# Patient Record
Sex: MALE | Race: WHITE | NOT HISPANIC OR LATINO | Employment: OTHER | URBAN - METROPOLITAN AREA
[De-identification: names, ages, dates, MRNs, and addresses within clinical notes are randomized per-mention and may not be internally consistent; named-entity substitution may affect disease eponyms.]

---

## 2017-06-09 ENCOUNTER — CLINICAL SUPPORT NO REQUIREMENTS (OUTPATIENT)
Dept: CARDIOLOGY | Facility: CLINIC | Age: 77
End: 2017-06-09

## 2017-06-09 DIAGNOSIS — Z95.0 PACEMAKER: Primary | ICD-10-CM

## 2017-06-09 PROCEDURE — 93294 REM INTERROG EVL PM/LDLS PM: CPT | Performed by: INTERNAL MEDICINE

## 2017-06-09 PROCEDURE — 93296 REM INTERROG EVL PM/IDS: CPT | Performed by: INTERNAL MEDICINE

## 2019-02-19 ENCOUNTER — APPOINTMENT (RX ONLY)
Dept: URBAN - METROPOLITAN AREA CLINIC 56 | Facility: CLINIC | Age: 79
Setting detail: DERMATOLOGY
End: 2019-02-19

## 2019-02-19 DIAGNOSIS — L20.89 OTHER ATOPIC DERMATITIS: ICD-10-CM

## 2019-02-19 DIAGNOSIS — L85.3 XEROSIS CUTIS: ICD-10-CM

## 2019-02-19 PROBLEM — L30.9 DERMATITIS, UNSPECIFIED: Status: ACTIVE | Noted: 2019-02-19

## 2019-02-19 PROBLEM — H91.90 UNSPECIFIED HEARING LOSS, UNSPECIFIED EAR: Status: ACTIVE | Noted: 2019-02-19

## 2019-02-19 PROBLEM — M12.9 ARTHROPATHY, UNSPECIFIED: Status: ACTIVE | Noted: 2019-02-19

## 2019-02-19 PROBLEM — N40.0 BENIGN PROSTATIC HYPERPLASIA WITHOUT LOWER URINARY TRACT SYMPTOMS: Status: ACTIVE | Noted: 2019-02-19

## 2019-02-19 PROBLEM — I10 ESSENTIAL (PRIMARY) HYPERTENSION: Status: ACTIVE | Noted: 2019-02-19

## 2019-02-19 PROBLEM — E78.5 HYPERLIPIDEMIA, UNSPECIFIED: Status: ACTIVE | Noted: 2019-02-19

## 2019-02-19 PROCEDURE — 11104 PUNCH BX SKIN SINGLE LESION: CPT

## 2019-02-19 PROCEDURE — ? COUNSELING

## 2019-02-19 PROCEDURE — 99203 OFFICE O/P NEW LOW 30 MIN: CPT | Mod: 25

## 2019-02-19 PROCEDURE — ? BIOPSY BY PUNCH METHOD

## 2019-02-19 PROCEDURE — ? ADDITIONAL NOTES

## 2019-02-19 PROCEDURE — ? INVENTORY

## 2019-02-19 ASSESSMENT — LOCATION DETAILED DESCRIPTION DERM
LOCATION DETAILED: RIGHT SUPERIOR MEDIAL MIDBACK
LOCATION DETAILED: LEFT ANTERIOR PROXIMAL THIGH
LOCATION DETAILED: LEFT ANKLE
LOCATION DETAILED: RIGHT ANTERIOR PROXIMAL THIGH
LOCATION DETAILED: RIGHT ANKLE
LOCATION DETAILED: RIGHT ANTERIOR DISTAL THIGH
LOCATION DETAILED: LEFT ANTERIOR DISTAL THIGH

## 2019-02-19 ASSESSMENT — LOCATION SIMPLE DESCRIPTION DERM
LOCATION SIMPLE: RIGHT ANKLE
LOCATION SIMPLE: LEFT THIGH
LOCATION SIMPLE: LEFT ANKLE
LOCATION SIMPLE: RIGHT THIGH
LOCATION SIMPLE: RIGHT LOWER BACK

## 2019-02-19 ASSESSMENT — LOCATION ZONE DERM
LOCATION ZONE: TRUNK
LOCATION ZONE: LEG

## 2019-02-19 NOTE — PROCEDURE: BIOPSY BY PUNCH METHOD
Billing Type: Third-Party Bill
Additional Anesthesia Volume In Cc (Will Not Render If 0): 0
Anesthesia Type: 0.5% bupivacaine
Detail Level: Detailed
Suture Removal: 14 days
Wound Care: Petrolatum
Punch Size In Mm: 4
Anesthesia Volume In Cc (Will Not Render If 0): 0.5
Bill For Surgical Tray: no
Post-Care Instructions: I reviewed with the patient in detail post-care instructions. Patient is to keep the biopsy site dry overnight, and then apply vaseline twice daily until healed. Patient may apply hydrogen peroxide soaks to remove any crusting.
Home Suture Removal Text: Patient was provided a home suture removal kit and will remove their sutures at home.  If they have any questions or difficulties they will call the office.
Epidermal Sutures: 4-0 Ethilon
Hemostasis: None
Lab: 6
Was A Bandage Applied: Yes
Consent: Written consent was obtained and risks were reviewed including but not limited to scarring, infection, bleeding, scabbing, incomplete removal, nerve damage and allergy to anesthesia.
Dressing: bandage
Notification Instructions: Patient will be notified of biopsy results. However, patient instructed to call the office if not contacted within 2 weeks.
Biopsy Type: H and E
Lab Facility: [id number]

## 2019-02-19 NOTE — PROCEDURE: ADDITIONAL NOTES
Detail Level: Simple
Additional Notes: Patient has finished prednisone taper and has done Benadryl per pcp.\\nContinue fluocinonide 0.05% ointment BID\\nMoisturize. Recommend Cerave ointment daily \\nPending biopsy report.

## 2019-03-05 ENCOUNTER — APPOINTMENT (RX ONLY)
Dept: URBAN - METROPOLITAN AREA CLINIC 56 | Facility: CLINIC | Age: 79
Setting detail: DERMATOLOGY
End: 2019-03-05

## 2019-03-05 DIAGNOSIS — L20.89 OTHER ATOPIC DERMATITIS: ICD-10-CM | Status: IMPROVED

## 2019-03-05 DIAGNOSIS — Z48.02 ENCOUNTER FOR REMOVAL OF SUTURES: ICD-10-CM

## 2019-03-05 PROBLEM — L20.84 INTRINSIC (ALLERGIC) ECZEMA: Status: ACTIVE | Noted: 2019-03-05

## 2019-03-05 PROCEDURE — ? ADDITIONAL NOTES

## 2019-03-05 PROCEDURE — 99213 OFFICE O/P EST LOW 20 MIN: CPT

## 2019-03-05 PROCEDURE — 99024 POSTOP FOLLOW-UP VISIT: CPT

## 2019-03-05 PROCEDURE — ? COUNSELING

## 2019-03-05 PROCEDURE — ? SUTURE REMOVAL (GLOBAL PERIOD)

## 2019-03-05 ASSESSMENT — LOCATION SIMPLE DESCRIPTION DERM
LOCATION SIMPLE: LEFT THIGH
LOCATION SIMPLE: RIGHT THIGH

## 2019-03-05 ASSESSMENT — LOCATION DETAILED DESCRIPTION DERM
LOCATION DETAILED: LEFT ANTERIOR DISTAL THIGH
LOCATION DETAILED: RIGHT ANTERIOR PROXIMAL THIGH

## 2019-03-05 ASSESSMENT — LOCATION ZONE DERM: LOCATION ZONE: LEG

## 2019-03-05 NOTE — PROCEDURE: SUTURE REMOVAL (GLOBAL PERIOD)
Detail Level: Detailed
Add 42361 Cpt? (Important Note: In 2017 The Use Of 48086 Is Being Tracked By Cms To Determine Future Global Period Reimbursement For Global Periods): yes

## 2019-03-05 NOTE — PROCEDURE: ADDITIONAL NOTES
Additional Notes: Continue cerave ointment daily.\\nMay use fluocinonide ointment as needed for itching.\\nDiscontinue Benadryl.\\nBiopsy results reviewed and discussed
Detail Level: Simple

## 2019-05-01 ENCOUNTER — APPOINTMENT (RX ONLY)
Dept: URBAN - METROPOLITAN AREA CLINIC 56 | Facility: CLINIC | Age: 79
Setting detail: DERMATOLOGY
End: 2019-05-01

## 2019-05-01 DIAGNOSIS — L20.89 OTHER ATOPIC DERMATITIS: ICD-10-CM | Status: IMPROVED

## 2019-05-01 PROBLEM — L20.84 INTRINSIC (ALLERGIC) ECZEMA: Status: ACTIVE | Noted: 2019-05-01

## 2019-05-01 PROCEDURE — ? COUNSELING

## 2019-05-01 PROCEDURE — ? ADDITIONAL NOTES

## 2019-05-01 PROCEDURE — ? INVENTORY

## 2019-05-01 PROCEDURE — 99213 OFFICE O/P EST LOW 20 MIN: CPT

## 2019-05-01 ASSESSMENT — LOCATION DETAILED DESCRIPTION DERM
LOCATION DETAILED: LEFT ANTERIOR DISTAL THIGH
LOCATION DETAILED: RIGHT ANTERIOR DISTAL THIGH

## 2019-05-01 ASSESSMENT — LOCATION SIMPLE DESCRIPTION DERM
LOCATION SIMPLE: RIGHT THIGH
LOCATION SIMPLE: LEFT THIGH

## 2019-05-01 ASSESSMENT — LOCATION ZONE DERM: LOCATION ZONE: LEG

## 2019-05-01 NOTE — PROCEDURE: ADDITIONAL NOTES
Detail Level: Simple
Additional Notes: Continue CeraVe healing ointment daily.\\nUse Fluocinonide 0.05% ointment as needed for flare/itch.

## 2019-05-01 NOTE — PROCEDURE: MIPS QUALITY
Detail Level: Detailed
Quality 431: Preventive Care And Screening: Unhealthy Alcohol Use - Screening: Patient screened for unhealthy alcohol use using a single question and scores less than 2 times per year
Quality 111:Pneumonia Vaccination Status For Older Adults: Pneumococcal Vaccination Previously Received
Quality 226: Preventive Care And Screening: Tobacco Use: Screening And Cessation Intervention: Patient screened for tobacco use and is an ex/non-smoker

## 2020-02-26 ENCOUNTER — IMPORTED ENCOUNTER (OUTPATIENT)
Dept: URBAN - METROPOLITAN AREA CLINIC 50 | Facility: CLINIC | Age: 80
End: 2020-02-26

## 2020-03-03 ENCOUNTER — IMPORTED ENCOUNTER (OUTPATIENT)
Dept: URBAN - METROPOLITAN AREA CLINIC 50 | Facility: CLINIC | Age: 80
End: 2020-03-03

## 2020-03-09 ENCOUNTER — IMPORTED ENCOUNTER (OUTPATIENT)
Dept: URBAN - METROPOLITAN AREA CLINIC 50 | Facility: CLINIC | Age: 80
End: 2020-03-09

## 2020-03-10 ENCOUNTER — IMPORTED ENCOUNTER (OUTPATIENT)
Dept: URBAN - METROPOLITAN AREA CLINIC 50 | Facility: CLINIC | Age: 80
End: 2020-03-10

## 2020-07-30 ENCOUNTER — OFFICE VISIT CONVERTED (OUTPATIENT)
Dept: CARDIOLOGY | Facility: CLINIC | Age: 80
End: 2020-07-30
Attending: INTERNAL MEDICINE

## 2020-08-08 ENCOUNTER — HOSPITAL ENCOUNTER (OUTPATIENT)
Dept: PREADMISSION TESTING | Facility: HOSPITAL | Age: 80
Discharge: HOME | End: 2020-08-08
Attending: OPHTHALMOLOGY

## 2020-08-09 LAB — SARS-COV-2 RNA SPEC QL NAA+PROBE: NOT DETECTED

## 2020-08-15 ENCOUNTER — HOSPITAL ENCOUNTER (OUTPATIENT)
Dept: PREADMISSION TESTING | Facility: HOSPITAL | Age: 80
Discharge: HOME | End: 2020-08-15
Attending: OPHTHALMOLOGY

## 2020-08-16 LAB — SARS-COV-2 RNA SPEC QL NAA+PROBE: NOT DETECTED

## 2020-08-20 ENCOUNTER — HOSPITAL ENCOUNTER (OUTPATIENT)
Dept: PERIOP | Facility: HOSPITAL | Age: 80
Setting detail: HOSPITAL OUTPATIENT SURGERY
Discharge: HOME | End: 2020-08-20
Attending: OPHTHALMOLOGY

## 2020-08-22 ENCOUNTER — HOSPITAL ENCOUNTER (OUTPATIENT)
Dept: PREADMISSION TESTING | Facility: HOSPITAL | Age: 80
Discharge: HOME | End: 2020-08-22
Attending: OPHTHALMOLOGY

## 2020-08-22 LAB — SARS-COV-2 RNA SPEC QL NAA+PROBE: NOT DETECTED

## 2020-08-27 ENCOUNTER — HOSPITAL ENCOUNTER (OUTPATIENT)
Dept: PERIOP | Facility: HOSPITAL | Age: 80
Setting detail: HOSPITAL OUTPATIENT SURGERY
Discharge: HOME | End: 2020-08-27
Attending: OPHTHALMOLOGY

## 2020-11-05 ENCOUNTER — IMPORTED ENCOUNTER (OUTPATIENT)
Dept: URBAN - METROPOLITAN AREA CLINIC 50 | Facility: CLINIC | Age: 80
End: 2020-11-05

## 2021-04-17 ASSESSMENT — TONOMETRY
OD_IOP_MMHG: 14
OS_IOP_MMHG: 14
OD_IOP_MMHG: 14
OS_IOP_MMHG: 15

## 2021-04-17 ASSESSMENT — VISUAL ACUITY
OS_CC: 20/30-2
OS_BAT: 20/200
OD_CC: J2@ 14 IN
OD_CC: 20/30-2
OS_OTHER: 20/200. >20/400.
OS_CC: J2@ 14 IN
OS_CC: 20/25-1
OD_BAT: 20/70
OD_CC: 20/25-1
OD_OTHER: 20/70. >20/400.

## 2021-05-10 NOTE — H&P
History and Physical      Patient Name: Renae Pnadya   Patient ID: [id number]   Sex: Male   YOB: 1940    Primary Care Provider: Abelardo Roger MD   Referring Provider: Abelardo Roger MD    Visit Date: July 30, 2020    Provider: Cole Clark MD   Location: [facility]   Location Address: [address] [place] [address], [facility]   [place], [place]  [id number]   Location Phone: [phone]          Chief Complaint     Bradycardia.  Afib.       History Of Present Illness  Consult requested by: Abelardo Roger MD   Renae Pandya is a 80 year old male with a history of bradycardia with dual-chamber pacemaker, paroxysmal atrial fibrillation, and hypertension who denies any problems with chest discomfort, shortness of breath, PND, orthopnea, or lower extremity edema.   PAST MEDICAL HISTORY: Bradycardia with dual-chamber pacemaker; Dementia; Dyslipidemia; Paroxysmal atrial fibrillation; Hypertension.   FAMILY MEDICAL HISTORY: Nonsignificant for premature coronary atherosclerotic disease.   PSYCHOSOCIAL HISTORY: Previously smoked, but quit. Moderate amount of alcohol. Denies caffeine use. .   CURRENT MEDICATIONS: Eliquis 5 mg b.i.d.; amlodipine 10 mg daily; memantine 10 mg b.i.d.; famotidine 20 mg daily; hydroxyzine pamoate 25 mg q. 6 h. p.r.n.; cholecalciferol 1000 units daily; calcium 600 mg daily; losartan 100 mg 1/2 daily; tamsulosin 0.4 mg daily; donepezil 10 mg 1/2 q. h.s.; latanoprost eye drops; clonidine 1 mg p.r.n.; atorvastatin 40 mg 1/2 daily.   ALLERGIES: Codeine; penicillin; sulfa drugs.       Review of Systems  · Constitutional  o Admits  o : good general health lately  o Denies  o : fatigue, recent weight changes   · Eyes  o Denies  o : double vision  · HENT  o Admits  o : hearing loss or ringing, chronic sinus problem  o Denies  o : swollen glands in neck  · Cardiovascular  o Denies  o : chest pain, palpitations (fast, fluttering, or skipping beats), swelling (feet, ankles, hands),  "shortness of breath while walking or lying flat  · Respiratory  o Denies  o : asthma or wheezing, COPD  · Gastrointestinal  o Admits  o : ulcers  o Denies  o : nausea or vomiting  · Neurologic  o Denies  o : lightheaded or dizzy, stroke, headaches  · Musculoskeletal  o Admits  o : joint pain, back pain  · Endocrine  o Denies  o : thyroid disease, diabetes, heat or cold intolerance, excessive thirst or urination  · Heme-Lymph  o Denies  o : bleeding or bruising tendency, anemia      Vitals  Date Time BP Position Site L\R Cuff Size HR RR TEMP (F) WT  HT  BMI kg/m2 BSA m2 O2 Sat HC       07/30/2020 12:58 /80 Sitting    76 - R   188lbs 0oz 5'  7\" 29.44 2.01     07/30/2020 12:58 /70 Sitting    64 - R                 Physical Examination  · Constitutional  o Appearance  o : Awake, alert, in no acute distress.   · Head and Face  o HEENT  o : PERRLA.  · Eyes  o Conjunctivae  o : Normal.  · Ears, Nose, Mouth and Throat  o Oral Cavity  o :   § Oral Mucosa  § : Normal.  · Neck  o Inspection/Palpation  o : No JVD.  · Respiratory  o Respiratory  o : Chest is symmetrical. Lung fields are clear. No rhonchi or wheezes heard.  · Cardiovascular  o Heart  o :   § Auscultation of Heart  § : S1, S2 normal. Regular rate and rhythm without murmurs, gallops, or rubs.  o Peripheral Vascular System  o :   § Extremities  § : +1 lower extremity edema.  · Gastrointestinal  o Abdominal Examination  o : Abdomen soft. No masses. No guarding or rigidity. No hepatosplenomegaly. Bowel sounds normal.  · Musculoskeletal  o General  o :   § General Musculoskeletal  § : Muscle tone and strength were normal.  · Skin and Subcutaneous Tissue  o General Inspection  o : Unremarkable.  · EKG  o EKG  o : Performed in the office today.  o Indications  o : Bradycardia.  o Results  o : Normal sinus versus ectopic atrial rhythm with abnormal T-waves in the anterolateral leads.   o Comparison  o : No previous EKG to compare to.   · Device " Interrogation  o Device Interrogation  o : Dual-chamber pacemaker interrogated today. Right atrial lead paced 82% of the time, otherwise working normally. Right ventricular lead paced 36% of the time, working normally. No events recorded. No changes made to the device. His previous interrogation of his device did show brief atrial fibrillation, a total of 5 episodes, most of which were less than a minute in duration.           Assessment     ASSESSMENT & PLAN:    1.  Bradycardia with dual-chamber pacemaker working properly.  Repeat interrogation in 6 months.  2.  Paroxysmal atrial fibrillation, brief, no recurrent episodes since last interrogation.  Patient is on Eliquis for        CVA prevention.    3.  Hypertension, mildly elevated.  Will increase his losartan to 100 mg daily.  Repeat renal panel in a few        weeks.        Cole Clark MD  JH:                  Electronically Signed by: Jackie De León-, Other -Author on August 12, 2020 08:15:00 AM  Electronically Co-signed by: Cole Clark MD -Reviewer on August 13, 2020 09:28:17 AM

## 2021-05-15 VITALS
HEART RATE: 76 BPM | SYSTOLIC BLOOD PRESSURE: 174 MMHG | DIASTOLIC BLOOD PRESSURE: 80 MMHG | WEIGHT: 188 LBS | HEIGHT: 67 IN | BODY MASS INDEX: 29.51 KG/M2

## 2021-08-04 PROBLEM — I48.0 PAF (PAROXYSMAL ATRIAL FIBRILLATION) (HCC): Status: ACTIVE | Noted: 2021-08-04

## 2021-08-04 NOTE — PROGRESS NOTES
Chief Complaint  Hypertension    Subjective    Patient is doing well does report some memory problems at times but no other active complaints  Past Medical History:   Diagnosis Date   • Essential hypertension 6/23/2016   • PAF (paroxysmal atrial fibrillation) (CMS/HCC) 8/4/2021   • Sick sinus syndrome w/ dual PPM 10/9/2015         Current Outpatient Medications:   •  amLODIPine (NORVASC) 10 MG tablet, Take  by mouth., Disp: , Rfl:   •  Calcium Carb-Cholecalciferol (CALCIUM 600 + D) 600-200 MG-UNIT tablet, Take  by mouth daily., Disp: , Rfl:   •  cloNIDine (CATAPRES) 0.1 MG tablet, Take 0.1 mg by mouth 2 (two) times a day., Disp: , Rfl:   •  donepezil (ARICEPT) 10 MG tablet, Take  by mouth daily., Disp: , Rfl:   •  esomeprazole (NEXIUM) 20 MG capsule, Take  by mouth daily., Disp: , Rfl:   •  latanoprost (XALATAN) 0.005 % ophthalmic solution, Apply  to eye., Disp: , Rfl:   •  losartan (COZAAR) 100 MG tablet, Take 100 mg by mouth Daily., Disp: , Rfl:   •  memantine (NAMENDA XR) 28 MG capsule sustained-release 24 hr extended release capsule, Take  by mouth., Disp: , Rfl:   •  Multiple Vitamin (MULTI VITAMIN DAILY) tablet, Take  by mouth daily., Disp: , Rfl:   •  simvastatin (ZOCOR) 20 MG tablet, Take  by mouth daily., Disp: , Rfl:   •  vitamin D (ERGOCALCIFEROL) 90228 UNITS capsule capsule, Take 50,000 Units by mouth 1 (one) time per week., Disp: , Rfl:   •  aspirin (aspirin) 81 MG EC tablet, Take 1 tablet by mouth Daily., Disp: 90 tablet, Rfl: 3  •  finasteride (PROSCAR) 5 MG tablet, Take  by mouth daily., Disp: , Rfl:   •  LORazepam (ATIVAN) 1 MG tablet, Take  by mouth., Disp: , Rfl:     Medications Discontinued During This Encounter   Medication Reason   • bisoprolol-hydrochlorothiazide (ZIAC) 5-6.25 MG per tablet    • aspirin 81 MG tablet    • Eliquis 5 MG tablet tablet      Allergies   Allergen Reactions   • Codeine Sulfate    • Penicillins    • Sulfa Antibiotics         Social History     Tobacco Use   • Smoking  "status: Former Smoker   • Smokeless tobacco: Never Used   Vaping Use   • Vaping Use: Never used   Substance Use Topics   • Alcohol use: No   • Drug use: No       History reviewed. No pertinent family history.     Objective     /59 (BP Location: Left arm)   Pulse 55   Ht 170.2 cm (67\")   Wt 86.9 kg (191 lb 9.6 oz)   BMI 30.01 kg/m²       Physical Exam    General Appearance:   · no acute distress  · Alert and oriented x3  HENT:   · lips not cyanotic  · Atraumatic  Neck:  · No jvd   · supple  Respiratory:  · no respiratory distress  · normal breath sounds  · no rales  Cardiovascular:  · Regular rate and rhythm  · no S3, no S4   · no murmur  · no rub  Extremities  · No cyanosis  · lower extremity edema: none    Skin:   · warm, dry  · No rashes      Result Review :     No results found for: PROBNP  Dual-chamber pacemaker working properly reviewed interrogation he has not had any paroxysmal atrial fibrillation episodes in over 2 years     No results found for: TSH   No results found for: FREET4   No results found for: DDIMERQUANT  No results found for: MG   No results found for: DIGOXIN   No results found for: TROPONINT          No results found for: POCTROP    Results for orders placed during the hospital encounter of 06/23/16    SCANNED - ECHOCARDIOGRAM                 Diagnoses and all orders for this visit:    1. PAF (paroxysmal atrial fibrillation) (CMS/HCC) (Primary)  Assessment & Plan:  Patient with no recurrent episodes on interrogation in the last couple years he wishes to discontinue the Eliquis for this not unreasonable and recommended discontinuing aspirin 81 daily we will continue with monitoring of his pacemaker      2. Bradycardia, sinus    3. Essential hypertension    4. Sick sinus syndrome (CMS/HCC)  Assessment & Plan:  Is working properly repeat interrogation 6 months no changes made      Other orders  -     aspirin (aspirin) 81 MG EC tablet; Take 1 tablet by mouth Daily.  Dispense: 90 tablet; " Refill: 3          Follow Up     Return in about 18 weeks (around 12/14/2021) for Follow with Sharon Gueavra.          Patient was given instructions and counseling regarding his condition or for health maintenance advice. Please see specific information pulled into the AVS if appropriate.

## 2021-08-10 ENCOUNTER — CLINICAL SUPPORT NO REQUIREMENTS (OUTPATIENT)
Dept: CARDIOLOGY | Facility: CLINIC | Age: 81
End: 2021-08-10

## 2021-08-10 ENCOUNTER — OFFICE VISIT (OUTPATIENT)
Dept: CARDIOLOGY | Facility: CLINIC | Age: 81
End: 2021-08-10

## 2021-08-10 VITALS
BODY MASS INDEX: 30.07 KG/M2 | HEIGHT: 67 IN | SYSTOLIC BLOOD PRESSURE: 146 MMHG | HEART RATE: 55 BPM | WEIGHT: 191.6 LBS | DIASTOLIC BLOOD PRESSURE: 59 MMHG

## 2021-08-10 DIAGNOSIS — I10 ESSENTIAL HYPERTENSION: ICD-10-CM

## 2021-08-10 DIAGNOSIS — I49.5 SSS (SICK SINUS SYNDROME) (HCC): Primary | ICD-10-CM

## 2021-08-10 DIAGNOSIS — I49.5 SICK SINUS SYNDROME (HCC): ICD-10-CM

## 2021-08-10 DIAGNOSIS — I48.0 PAF (PAROXYSMAL ATRIAL FIBRILLATION) (HCC): Primary | ICD-10-CM

## 2021-08-10 DIAGNOSIS — R00.1 BRADYCARDIA, SINUS: ICD-10-CM

## 2021-08-10 PROCEDURE — 99214 OFFICE O/P EST MOD 30 MIN: CPT | Performed by: INTERNAL MEDICINE

## 2021-08-10 PROCEDURE — 93280 PM DEVICE PROGR EVAL DUAL: CPT | Performed by: INTERNAL MEDICINE

## 2021-08-10 RX ORDER — LOSARTAN POTASSIUM 100 MG/1
100 TABLET ORAL DAILY
COMMUNITY
End: 2021-11-08

## 2021-08-10 RX ORDER — APIXABAN 5 MG/1
TABLET, FILM COATED ORAL
COMMUNITY
Start: 2021-05-17 | End: 2021-08-10

## 2021-08-10 RX ORDER — ASPIRIN 81 MG/1
81 TABLET ORAL DAILY
Qty: 90 TABLET | Refills: 3 | Status: SHIPPED | OUTPATIENT
Start: 2021-08-10

## 2021-08-10 NOTE — PROGRESS NOTES
Normal Dual Chamber Pacemaker device interrogation.  Normal evaluation of device function and lead measurements.  No optimization was needed of parameters or maximization of device longevity. Remaining battery is 3 years.  He travels to [place] and also gets his device followed up there, he is deciding if he will return here full time.

## 2021-08-10 NOTE — ASSESSMENT & PLAN NOTE
Patient with no recurrent episodes on interrogation in the last couple years he wishes to discontinue the Eliquis for this not unreasonable and recommended discontinuing aspirin 81 daily we will continue with monitoring of his pacemaker

## 2021-11-08 ENCOUNTER — OFFICE VISIT (OUTPATIENT)
Dept: CARDIOLOGY | Facility: CLINIC | Age: 81
End: 2021-11-08

## 2021-11-08 VITALS
HEIGHT: 67 IN | SYSTOLIC BLOOD PRESSURE: 163 MMHG | WEIGHT: 187 LBS | DIASTOLIC BLOOD PRESSURE: 65 MMHG | HEART RATE: 55 BPM | BODY MASS INDEX: 29.35 KG/M2

## 2021-11-08 DIAGNOSIS — I10 ESSENTIAL HYPERTENSION: ICD-10-CM

## 2021-11-08 DIAGNOSIS — I49.5 SICK SINUS SYNDROME (HCC): ICD-10-CM

## 2021-11-08 DIAGNOSIS — I48.0 PAF (PAROXYSMAL ATRIAL FIBRILLATION) (HCC): Primary | ICD-10-CM

## 2021-11-08 PROCEDURE — 99214 OFFICE O/P EST MOD 30 MIN: CPT | Performed by: NURSE PRACTITIONER

## 2021-11-08 RX ORDER — LOSARTAN POTASSIUM AND HYDROCHLOROTHIAZIDE 25; 100 MG/1; MG/1
1 TABLET ORAL DAILY
Qty: 90 TABLET | Refills: 1 | Status: SHIPPED | OUTPATIENT
Start: 2021-11-08 | End: 2022-11-21 | Stop reason: SDUPTHER

## 2021-11-08 NOTE — PROGRESS NOTES
Chief Complaint  Hypertension    Subjective            History of Present Illness  Renae Pandya is a 81-year-old white/ male patient who presents to the office today for follow-up.  He has paroxysmal atrial fibrillation, sick sinus syndrome with dual-chamber pacemaker, and hypertension.  He reports some bilateral lower extremity edema.  He also reports that his blood pressure has systolically been running in the 150s.  He reports compliance with all of his medications.  He denies any chest pain, shortness of breath, lightheadedness/dizziness, or palpitations.    PMH  Past Medical History:   Diagnosis Date   • Essential hypertension 6/23/2016   • PAF (paroxysmal atrial fibrillation) ([facility]) 8/4/2021   • Sick sinus syndrome w/ dual PPM 10/9/2015         ALLERGY  Allergies   Allergen Reactions   • Codeine Sulfate    • Penicillins    • Sulfa Antibiotics           SURGICALHX  History reviewed. No pertinent surgical history.       SOC  Social History     Socioeconomic History   • Marital status:    Tobacco Use   • Smoking status: Former Smoker   • Smokeless tobacco: Never Used   Vaping Use   • Vaping Use: Never used   Substance and Sexual Activity   • Alcohol use: No   • Drug use: No   • Sexual activity: Defer         FAMHX  History reviewed. No pertinent family history.       MEDSIGONLY  Current Outpatient Medications on File Prior to Visit   Medication Sig   • amLODIPine (NORVASC) 10 MG tablet Take  by mouth.   • aspirin (aspirin) 81 MG EC tablet Take 1 tablet by mouth Daily.   • Calcium Carb-Cholecalciferol (CALCIUM 600 + D) 600-200 MG-UNIT tablet Take  by mouth daily.   • Cholecalciferol (Vitamin D3) 25 MCG (1000 UT) capsule Take 1,000 Units by mouth Daily.   • cloNIDine (CATAPRES) 0.1 MG tablet Take 0.1 mg by mouth 2 (two) times a day.   • donepezil (ARICEPT) 10 MG tablet Take  by mouth daily.   • esomeprazole (NEXIUM) 20 MG capsule Take  by mouth daily.   • finasteride (PROSCAR) 5 MG tablet Take  by  "mouth daily.   • latanoprost (XALATAN) 0.005 % ophthalmic solution Apply  to eye.   • LORazepam (ATIVAN) 1 MG tablet Take  by mouth.   • losartan (COZAAR) 100 MG tablet Take 100 mg by mouth Daily.   • memantine (NAMENDA XR) 28 MG capsule sustained-release 24 hr extended release capsule Take  by mouth.   • Multiple Vitamin (MULTI VITAMIN DAILY) tablet Take  by mouth daily.   • simvastatin (ZOCOR) 20 MG tablet Take  by mouth daily.   • vitamin D (ERGOCALCIFEROL) 55543 UNITS capsule capsule Take 50,000 Units by mouth 1 (one) time per week.     No current facility-administered medications on file prior to visit.         Objective   /65   Pulse 55   Ht 170.2 cm (67\")   Wt 84.8 kg (187 lb)   BMI 29.29 kg/m²       Physical Exam  HENT:      Head: Normocephalic.   Neck:      Vascular: No carotid bruit.   Cardiovascular:      Rate and Rhythm: Normal rate and regular rhythm.      Pulses: Normal pulses.      Heart sounds: Normal heart sounds. No murmur heard.      Pulmonary:      Effort: Pulmonary effort is normal.      Breath sounds: Normal breath sounds.   Musculoskeletal:      Cervical back: Neck supple.      Right lower le+ Edema present.      Left lower le+ Edema present.   Skin:     General: Skin is dry.      Capillary Refill: Capillary refill takes less than 2 seconds.   Neurological:      Mental Status: He is alert and oriented to person, place, and time. Mental status is at baseline.   Psychiatric:         Mood and Affect: Mood normal.         Behavior: Behavior normal.          Result Review :   The following data was reviewed by: SANTI Unger on 2021:  No results found for: PROBNP    No results found for: TSH   No results found for: FREET4   No results found for: DDIMERQUANT  No results found for: MG   No results found for: DIGOXIN   No results found for: TROPONINT        No recent labs for review, requesting most recent from PCP office       Assessment and Plan    Diagnoses and all " orders for this visit:    1. PAF (paroxysmal atrial fibrillation) (Primary)  Symptomatically stable at this time, continue to monitor. Patient with no recurrent episodes on interrogation in the last couple years, he has taken it upon himself to discontinue the Eliquis. Recommend to continue aspirin 81 milligrams daily.    2. Sick sinus syndrome w/ dual PPM  He denies any issues today, repeat interrogation in 6 months.    3. Essential hypertension  Elevated blood pressure in office today and patient reports elevated blood pressures at home, change to losartan 100 mg daily to losartan hydrochlorothiazide 100-25 mg daily.  Advised to check blood pressure daily at home and to notify any out of range.  Obtain BMP in 2 weeks to evaluate renal function.  Obtain fasting lipid and hepatic function panel to measure hyperlipidemia since patient is taking simvastatin 20 mg nightly.  -     Basic Metabolic Panel; Future  -     Lipid Panel; Future  -     Hepatic Function Panel; Future    Other orders  -     losartan-hydrochlorothiazide (Hyzaar) 100-25 MG per tablet; Take 1 tablet by mouth Daily.  Dispense: 90 tablet; Refill: 1      Follow Up   Return in about 6 months (around 5/8/2022) for Follow up with Dr Clark and Device check.    Patient was given instructions and counseling regarding his condition or for health maintenance advice. Please see specific information pulled into the AVS if appropriate.     Renae Pandya  reports that he has quit smoking. He has never used smokeless tobacco.         Sharon Guevara, SANTI  11/08/21  13:49 EST    Dictated Utilizing Dragon Dictation

## 2021-11-08 NOTE — PATIENT INSTRUCTIONS
"Low-Sodium Eating Plan  Sodium, which is an element that makes up salt, helps you maintain a healthy balance of fluids in your body. Too much sodium can increase your blood pressure and cause fluid and waste to be held in your body.  Your health care provider or dietitian may recommend following this plan if you have high blood pressure (hypertension), kidney disease, liver disease, or heart failure. Eating less sodium can help lower your blood pressure, reduce swelling, and protect your heart, liver, and kidneys.  What are tips for following this plan?  Reading food labels  · The Nutrition Facts label lists the amount of sodium in one serving of the food. If you eat more than one serving, you must multiply the listed amount of sodium by the number of servings.  · Choose foods with less than 140 mg of sodium per serving.  · Avoid foods with 300 mg of sodium or more per serving.  Shopping    · Look for lower-sodium products, often labeled as \"low-sodium\" or \"no salt added.\"  · Always check the sodium content, even if foods are labeled as \"unsalted\" or \"no salt added.\"  · Buy fresh foods.  ? Avoid canned foods and pre-made or frozen meals.  ? Avoid canned, cured, or processed meats.  · Buy breads that have less than 80 mg of sodium per slice.    Cooking    · Eat more home-cooked food and less restaurant, buffet, and fast food.  · Avoid adding salt when cooking. Use salt-free seasonings or herbs instead of table salt or sea salt. Check with your health care provider or pharmacist before using salt substitutes.  · Cook with plant-based oils, such as canola, sunflower, or olive oil.    Meal planning  · When eating at a restaurant, ask that your food be prepared with less salt or no salt, if possible. Avoid dishes labeled as brined, pickled, cured, smoked, or made with soy sauce, miso, or teriyaki sauce.  · Avoid foods that contain MSG (monosodium glutamate). MSG is sometimes added to Chinese food, bouillon, and some " -- DO NOT REPLY / DO NOT REPLY ALL --  -- Message is from the [facility]--    COVID-19 Universal Screening: N/A - Not about scheduling    General Patient Message      Reason for Call: Patient needs a note for work stating that she is at high risk for having diabetes and high blood pressure. Please call when ready and patient will come pick it up.    Caller Information       Type Contact Phone    08/08/2020 10:13 AM Phone (Incoming) MohanEloy goncalves (Self) [phone] (M)          Alternative phone number: None    Turnaround time given to caller:   \"This message will be sent to Sharri Pa The clinical team will fulfill your request as soon as they review your message when the office opens on Monday (or after the holiday).\"     "canned foods.  · Make meals that can be grilled, baked, poached, roasted, or steamed. These are generally made with less sodium.  General information  Most people on this plan should limit their sodium intake to 1,500-2,000 mg (milligrams) of sodium each day.  What foods should I eat?  Fruits  Fresh, frozen, or canned fruit. Fruit juice.  Vegetables  Fresh or frozen vegetables. \"No salt added\" canned vegetables. \"No salt added\" tomato sauce and paste. Low-sodium or reduced-sodium tomato and vegetable juice.  Grains  Low-sodium cereals, including oats, puffed wheat and rice, and shredded wheat. Low-sodium crackers. Unsalted rice. Unsalted pasta. Low-sodium bread. Whole-grain breads and whole-grain pasta.  Meats and other proteins  Fresh or frozen (no salt added) meat, poultry, seafood, and fish. Low-sodium canned tuna and salmon. Unsalted nuts. Dried peas, beans, and lentils without added salt. Unsalted canned beans. Eggs. Unsalted nut butters.  Dairy  Milk. Soy milk. Cheese that is naturally low in sodium, such as ricotta cheese, fresh mozzarella, or Swiss cheese. Low-sodium or reduced-sodium cheese. Cream cheese. Yogurt.  Seasonings and condiments  Fresh and dried herbs and spices. Salt-free seasonings. Low-sodium mustard and ketchup. Sodium-free salad dressing. Sodium-free light mayonnaise. Fresh or refrigerated horseradish. Lemon juice. Vinegar.  Other foods  Homemade, reduced-sodium, or low-sodium soups. Unsalted popcorn and pretzels. Low-salt or salt-free chips.  The items listed above may not be a complete list of foods and beverages you can eat. Contact a dietitian for more information.  What foods should I avoid?  Vegetables  Sauerkraut, pickled vegetables, and relishes. Olives. French fries. Onion rings. Regular canned vegetables (not low-sodium or reduced-sodium). Regular canned tomato sauce and paste (not low-sodium or reduced-sodium). Regular tomato and vegetable juice (not low-sodium or reduced-sodium). " Frozen vegetables in sauces.  Grains  Instant hot cereals. Bread stuffing, pancake, and biscuit mixes. Croutons. Seasoned rice or pasta mixes. Noodle soup cups. Boxed or frozen macaroni and cheese. Regular salted crackers. Self-rising flour.  Meats and other proteins  Meat or fish that is salted, canned, smoked, spiced, or pickled. Precooked or cured meat, such as sausages or meat loaves. Alfaro. Ham. Pepperoni. Hot dogs. Corned beef. Chipped beef. Salt pork. Jerky. Pickled herring. Anchovies and sardines. Regular canned tuna. Salted nuts.  Dairy  Processed cheese and cheese spreads. Hard cheeses. Cheese curds. Blue cheese. Feta cheese. String cheese. Regular cottage cheese. Buttermilk. Canned milk.  Fats and oils  Salted butter. Regular margarine. Ghee. Alfaro fat.  Seasonings and condiments  Onion salt, garlic salt, seasoned salt, table salt, and sea salt. Canned and packaged gravies. Worcestershire sauce. Tartar sauce. Barbecue sauce. Teriyaki sauce. Soy sauce, including reduced-sodium. Steak sauce. Fish sauce. Oyster sauce. Cocktail sauce. Horseradish that you find on the shelf. Regular ketchup and mustard. Meat flavorings and tenderizers. Bouillon cubes. Hot sauce. Pre-made or packaged marinades. Pre-made or packaged taco seasonings. Relishes. Regular salad dressings. Salsa.  Other foods  Salted popcorn and pretzels. Corn chips and puffs. Potato and tortilla chips. Canned or dried soups. Pizza. Frozen entrees and pot pies.  The items listed above may not be a complete list of foods and beverages you should avoid. Contact a dietitian for more information.  Summary  · Eating less sodium can help lower your blood pressure, reduce swelling, and protect your heart, liver, and kidneys.  · Most people on this plan should limit their sodium intake to 1,500-2,000 mg (milligrams) of sodium each day.  · Canned, boxed, and frozen foods are high in sodium. Restaurant foods, fast foods, and pizza are also very high in sodium.  You also get sodium by adding salt to food.  · Try to cook at home, eat more fresh fruits and vegetables, and eat less fast food and canned, processed, or prepared foods.  This information is not intended to replace advice given to you by your health care provider. Make sure you discuss any questions you have with your health care provider.  Document Revised: 01/22/2021 Document Reviewed: 11/18/2020  [employer] Patient Education © 2021 [employer]

## 2022-05-17 ENCOUNTER — OFFICE VISIT (OUTPATIENT)
Dept: CARDIOLOGY | Facility: CLINIC | Age: 82
End: 2022-05-17

## 2022-05-17 VITALS
WEIGHT: 183 LBS | BODY MASS INDEX: 28.72 KG/M2 | SYSTOLIC BLOOD PRESSURE: 133 MMHG | DIASTOLIC BLOOD PRESSURE: 64 MMHG | HEART RATE: 80 BPM | HEIGHT: 67 IN

## 2022-05-17 DIAGNOSIS — I48.0 PAF (PAROXYSMAL ATRIAL FIBRILLATION): ICD-10-CM

## 2022-05-17 DIAGNOSIS — I49.5 SICK SINUS SYNDROME: Primary | ICD-10-CM

## 2022-05-17 DIAGNOSIS — I10 ESSENTIAL HYPERTENSION: ICD-10-CM

## 2022-05-17 PROCEDURE — 99214 OFFICE O/P EST MOD 30 MIN: CPT | Performed by: INTERNAL MEDICINE

## 2022-05-17 RX ORDER — DOXAZOSIN MESYLATE 4 MG/1
4 TABLET ORAL 2 TIMES DAILY
COMMUNITY

## 2022-05-17 RX ORDER — CLOPIDOGREL BISULFATE 75 MG/1
75 TABLET ORAL DAILY
COMMUNITY

## 2022-05-17 RX ORDER — TAMSULOSIN HYDROCHLORIDE 0.4 MG/1
1 CAPSULE ORAL DAILY
COMMUNITY
Start: 2022-04-01

## 2022-05-17 NOTE — PROGRESS NOTES
Chief Complaint  Atrial Fibrillation (paroxysmal)    Subjective    Patient had a fall before Christmas and was in the hospital for a few days evaluate for TIA symptom not found to have any obvious etiology.  Today he states that he has been doing well since then with no recurrent issues denies any chest pain stable shortness of breath    Past Medical History:   Diagnosis Date   • Essential hypertension 06/23/2016   • Hyperlipidemia    • PAF (paroxysmal atrial fibrillation) (HCC) 08/04/2021   • Sick sinus syndrome w/ dual PPM 10/09/2015         Current Outpatient Medications:   •  amLODIPine (NORVASC) 10 MG tablet, Take 5 mg by mouth 2 (Two) Times a Day., Disp: , Rfl:   •  aspirin (aspirin) 81 MG EC tablet, Take 1 tablet by mouth Daily., Disp: 90 tablet, Rfl: 3  •  Cholecalciferol (Vitamin D3) 25 MCG (1000 UT) capsule, Take 1,000 Units by mouth Daily., Disp: , Rfl:   •  clopidogrel (PLAVIX) 75 MG tablet, Take 75 mg by mouth Daily., Disp: , Rfl:   •  donepezil (ARICEPT) 10 MG tablet, Take  by mouth daily., Disp: , Rfl:   •  doxazosin (CARDURA) 4 MG tablet, Take 4 mg by mouth Every Night., Disp: , Rfl:   •  losartan-hydrochlorothiazide (Hyzaar) 100-25 MG per tablet, Take 1 tablet by mouth Daily., Disp: 90 tablet, Rfl: 1  •  memantine (NAMENDA XR) 28 MG capsule sustained-release 24 hr extended release capsule, Take 10 mg by mouth., Disp: , Rfl:   •  tamsulosin (FLOMAX) 0.4 MG capsule 24 hr capsule, , Disp: , Rfl:     Medications Discontinued During This Encounter   Medication Reason   • simvastatin (ZOCOR) 20 MG tablet    • vitamin D (ERGOCALCIFEROL) 20541 UNITS capsule capsule    • Multiple Vitamin (MULTI VITAMIN DAILY) tablet    • LORazepam (ATIVAN) 1 MG tablet    • latanoprost (XALATAN) 0.005 % ophthalmic solution    • finasteride (PROSCAR) 5 MG tablet    • esomeprazole (nexIUM) 20 MG capsule    • cloNIDine (CATAPRES) 0.1 MG tablet    • Calcium Carb-Cholecalciferol 600-200 MG-UNIT tablet      Allergies   Allergen  "Reactions   • Codeine Sulfate    • Penicillins    • Sulfa Antibiotics         Social History     Tobacco Use   • Smoking status: Former Smoker   • Smokeless tobacco: Never Used   Vaping Use   • Vaping Use: Never used   Substance Use Topics   • Alcohol use: No   • Drug use: No       History reviewed. No pertinent family history.     Objective     /64   Pulse 80   Ht 170.2 cm (67\")   Wt 83 kg (183 lb)   BMI 28.66 kg/m²       Physical Exam    General Appearance:   · no acute distress  · Alert and oriented x3  HENT:   · lips not cyanotic  · Atraumatic  Neck:  · No jvd   · supple  Respiratory:  · no respiratory distress  · normal breath sounds  · no rales  Cardiovascular:  · Regular rate and rhythm  · no S3, no S4   · no murmur  · no rub  Extremities  · No cyanosis  · lower extremity edema: none    Skin:   · warm, dry  · No rashes      Result Review :     No results found for: PROBNP           No results found for: POCTROP    Results for orders placed during the hospital encounter of 06/23/16    SCANNED - ECHOCARDIOGRAM                 Diagnoses and all orders for this visit:    1. Sick sinus syndrome w/ dual PPM (Primary)  Assessment & Plan:  Patient with no symptomatic bradycardic issues we will set him up for an in office pacemaker interrogation this will also be able evaluated to see if he had any recurrent A. fib episodes      2. Essential hypertension  Assessment & Plan:  Well-controlled on losartan 100 Dyazide 100/25      3. PAF (paroxysmal atrial fibrillation)  Assessment & Plan:  Clinically normal sinus rhythm is on aspirin Plavix not Eliquis due to fall issues will reassess his pacemaker to see if recurrent A. fib episodes            Follow Up     Return in about 6 months (around 11/17/2022) for Follow with Elpidio Fried w/f/u.          Patient was given instructions and counseling regarding his condition or for health maintenance advice. Please see specific information pulled into the AVS " if appropriate.

## 2022-05-17 NOTE — ASSESSMENT & PLAN NOTE
Clinically normal sinus rhythm is on aspirin Plavix not Eliquis due to fall issues will reassess his pacemaker to see if recurrent A. fib episodes

## 2022-05-17 NOTE — ASSESSMENT & PLAN NOTE
Patient with no symptomatic bradycardic issues we will set him up for an in office pacemaker interrogation this will also be able evaluated to see if he had any recurrent A. fib episodes

## 2022-09-07 ENCOUNTER — OFFICE VISIT (OUTPATIENT)
Dept: CARDIOLOGY | Facility: CLINIC | Age: 82
End: 2022-09-07

## 2022-09-07 ENCOUNTER — CLINICAL SUPPORT NO REQUIREMENTS (OUTPATIENT)
Dept: CARDIOLOGY | Facility: CLINIC | Age: 82
End: 2022-09-07

## 2022-09-07 VITALS
HEIGHT: 67 IN | WEIGHT: 181 LBS | SYSTOLIC BLOOD PRESSURE: 132 MMHG | HEART RATE: 88 BPM | BODY MASS INDEX: 28.41 KG/M2 | DIASTOLIC BLOOD PRESSURE: 64 MMHG

## 2022-09-07 DIAGNOSIS — R55 SYNCOPE AND COLLAPSE: Primary | ICD-10-CM

## 2022-09-07 DIAGNOSIS — I49.5 SSS (SICK SINUS SYNDROME): Primary | ICD-10-CM

## 2022-09-07 DIAGNOSIS — Z95.0 PRESENCE OF CARDIAC PACEMAKER: ICD-10-CM

## 2022-09-07 PROBLEM — K21.9 GERD (GASTROESOPHAGEAL REFLUX DISEASE): Status: ACTIVE | Noted: 2022-05-12

## 2022-09-07 PROBLEM — Z86.73 HISTORY OF CVA (CEREBROVASCULAR ACCIDENT): Status: ACTIVE | Noted: 2022-05-12

## 2022-09-07 PROBLEM — M19.90 OSTEOARTHRITIS: Status: ACTIVE | Noted: 2022-05-12

## 2022-09-07 PROBLEM — E78.2 MIXED HYPERLIPIDEMIA: Status: ACTIVE | Noted: 2022-05-12

## 2022-09-07 PROCEDURE — 93280 PM DEVICE PROGR EVAL DUAL: CPT | Performed by: INTERNAL MEDICINE

## 2022-09-07 PROCEDURE — 99213 OFFICE O/P EST LOW 20 MIN: CPT | Performed by: NURSE PRACTITIONER

## 2022-09-07 RX ORDER — ATORVASTATIN CALCIUM 40 MG/1
20 TABLET, FILM COATED ORAL DAILY
COMMUNITY
Start: 2022-08-11 | End: 2022-11-21 | Stop reason: SDUPTHER

## 2022-09-07 RX ORDER — LATANOPROST 50 UG/ML
SOLUTION/ DROPS OPHTHALMIC
COMMUNITY
Start: 2022-08-18

## 2022-09-07 RX ORDER — MEMANTINE HYDROCHLORIDE 10 MG/1
10 TABLET ORAL 2 TIMES DAILY
COMMUNITY
Start: 2022-09-04

## 2022-09-07 NOTE — PROGRESS NOTES
Normal Dual Chamber Pacemaker Device Interrogation and Device Testing.  Normal evaluation of device function and lead measurements.  No optimization was needed of parameters or maximization of device longevity.  Patient comes in every six months, not dependent.  Remaining battery is 2 1/2 years.

## 2022-09-07 NOTE — PROGRESS NOTES
Chief Complaint  Atrial Fibrillation and Hypertension    Subjective            History of Present Illness  Renae Pandya is an 82-year-old white/ male patient who presents to the office today for complaint of weakness, syncope, and reports multiple falls over the last year.  He lives in [place] for part of the year and reports an episode while he was down there, records will be requested from the [place] facility.  He does not remember everything that he was told due to memory issues.  His daughter is with him today and says that he has to hang onto walls to get around and that he has reported these types of symptoms intermittently since February.  He has a pacemaker implanted for sick sinus syndrome, paroxysmal atrial fibrillation, and hypertension.  He does not recall the last time his pacemaker was interrogated.  His family helps to remind him to take his medications in order to have compliance with his therapies.  He denies any chest pain, shortness of breath, palpitations, or edema.    PMH  Past Medical History:   Diagnosis Date   • Essential hypertension 06/23/2016   • Hyperlipidemia    • PAF (paroxysmal atrial fibrillation) ([facility]) 08/04/2021   • Sick sinus syndrome w/ dual PPM 10/09/2015         ALLERGY  Allergies   Allergen Reactions   • Codeine Sulfate    • Penicillins    • Propoxyphene Unknown (See Comments)   • Sulfa Antibiotics           SURGICALHX  History reviewed. No pertinent surgical history.       SOC  Social History     Socioeconomic History   • Marital status:    Tobacco Use   • Smoking status: Former Smoker   • Smokeless tobacco: Never Used   Vaping Use   • Vaping Use: Never used   Substance and Sexual Activity   • Alcohol use: No   • Drug use: No   • Sexual activity: Defer         FAMHX  History reviewed. No pertinent family history.       MEDSIGONLY  Current Outpatient Medications on File Prior to Visit   Medication Sig   • amLODIPine (NORVASC) 10 MG tablet Take 5 mg by mouth 2  "(Two) Times a Day.   • aspirin (aspirin) 81 MG EC tablet Take 1 tablet by mouth Daily.   • atorvastatin (LIPITOR) 40 MG tablet Take 40 mg by mouth Daily.   • Cholecalciferol (Vitamin D3) 25 MCG (1000 UT) capsule Take 1,000 Units by mouth Daily.   • clopidogrel (PLAVIX) 75 MG tablet Take 75 mg by mouth Daily.   • donepezil (ARICEPT) 10 MG tablet Take  by mouth daily.   • doxazosin (CARDURA) 4 MG tablet Take 4 mg by mouth Every Night.   • latanoprost (XALATAN) 0.005 % ophthalmic solution INSTILL 1 DROP IN BOTH EYES EVERY NIGHT AT BEDTIME   • losartan-hydrochlorothiazide (Hyzaar) 100-25 MG per tablet Take 1 tablet by mouth Daily.   • memantine (NAMENDA) 10 MG tablet Take 10 mg by mouth Daily.   • tamsulosin (FLOMAX) 0.4 MG capsule 24 hr capsule 1 capsule Daily.   • [DISCONTINUED] memantine (NAMENDA XR) 28 MG capsule sustained-release 24 hr extended release capsule Take 10 mg by mouth.     No current facility-administered medications on file prior to visit.         Objective   /64   Pulse 88   Ht 170.2 cm (67\")   Wt 82.1 kg (181 lb)   BMI 28.35 kg/m²       Physical Exam  HENT:      Head: Normocephalic.   Neck:      Vascular: No carotid bruit.   Cardiovascular:      Rate and Rhythm: Normal rate and regular rhythm.      Pulses: Normal pulses.      Heart sounds: Normal heart sounds. No murmur heard.  Pulmonary:      Effort: Pulmonary effort is normal.      Breath sounds: Normal breath sounds.   Musculoskeletal:      Cervical back: Neck supple.      Right lower leg: No edema.      Left lower leg: No edema.   Skin:     General: Skin is dry.      Capillary Refill: Capillary refill takes less than 2 seconds.   Neurological:      Mental Status: He is alert and oriented to person, place, and time.   Psychiatric:         Behavior: Behavior normal.       Result Review :   The following data was reviewed by: SANTI Unger on 09/07/2022:  No results found for: PROBNP     No results found for: TSH   No results found " for: FREET4   No results found for: DDIMERQUANT  No results found for: MG   No results found for: DIGOXIN   No results found for: TROPONINT          Results for orders placed during the hospital encounter of 06/23/16    SCANNED - ECHOCARDIOGRAM  · Left ventricular wall segments contract abnormally. Refer to wall scoring diagram for more information.  · Left atrial cavity size is mildly dilated.  · Mild mitral valve regurgitation is present  · Left Ventricle: Calculated EF = 54.3%  · There is no evidence of pericardial effusion.       Assessment and Plan    Diagnoses and all orders for this visit:    1. Syncope and collapse (Primary)  Ophthalmologist our device tech to interrogate the device today.  Interrogation revealed normal function of device with 2 and half years remaining on the battery.  There were no acute episodes reported on the device.  Review of [place] records shows a CTA of neck on 1/20/2022 which revealed widely patent carotids bilaterally.  At that time he also had a MRI of the brain done which did not show any acute findings, only chronic appearing.  Obtain echocardiogram to assess left ventricular systolic function and valvular function.  I also advised his family member to check his blood pressure and heart rate during these episodes to see if he potentially has orthostatic hypotension. Check blood pressure twice a day for the next two weeks, blood pressure log provided for patient.  Will review log once available to me will make any necessary medication adjustments needed at that time.  -     Adult Transthoracic Echo Complete W/ Cont if Necessary Per Protocol; Future        Follow Up   Return for Follow up with Dr Clark as scheduled.    Patient was given instructions and counseling regarding his condition or for health maintenance advice. Please see specific information pulled into the AVS if appropriate.     Zulmajessica Ray Pandya  reports that he has quit smoking. He has never used smokeless  tobacco.           Sharon Guevara, APRN  09/07/22  09:54 EDT    Dictated Utilizing Dragon Dictation

## 2022-11-14 ENCOUNTER — OFFICE VISIT (OUTPATIENT)
Dept: NEUROLOGY | Facility: CLINIC | Age: 82
End: 2022-11-14

## 2022-11-14 VITALS
HEART RATE: 52 BPM | WEIGHT: 182.4 LBS | DIASTOLIC BLOOD PRESSURE: 59 MMHG | BODY MASS INDEX: 28.63 KG/M2 | SYSTOLIC BLOOD PRESSURE: 163 MMHG | HEIGHT: 67 IN

## 2022-11-14 DIAGNOSIS — R41.89 SPELL OF ALTERED COGNITION: Primary | ICD-10-CM

## 2022-11-14 DIAGNOSIS — Z86.73 HISTORY OF CVA (CEREBROVASCULAR ACCIDENT): ICD-10-CM

## 2022-11-14 PROCEDURE — 99215 OFFICE O/P EST HI 40 MIN: CPT | Performed by: NURSE PRACTITIONER

## 2022-11-14 NOTE — PROGRESS NOTES
"Chief Complaint  Neurologic Problem    Subjective          Renae Pandya presents to [facility]  History of Present Illness  States he's having difficulty with memory.  Cell phone will ring and he won't be able to figure out how to answer it.  Struggles with names.  Some days he ambulates better than others.  Some days his \"legs just won't work\".  Niece reports staring spells, spells of altered consciousness.  Is having repetitive movement of right hand, and grunts often.  Struggles to walk.         Objective   Vital Signs:   /59   Pulse 52   Ht 170.2 cm (67\")   Wt 82.7 kg (182 lb 6.4 oz)   BMI 28.57 kg/m²     Physical Exam  Neurological:      Mental Status: He is oriented to person, place, and time.      Cranial Nerves: Cranial nerves 2-12 are intact.      Motor: Motor strength is normal.      Deep Tendon Reflexes:      Reflex Scores:       Brachioradialis reflexes are 2+ on the right side and 2+ on the left side.       Patellar reflexes are 1+ on the right side and 1+ on the left side.       Neurologic Exam     Mental Status   Oriented to person, place, and time.     Cranial Nerves   Cranial nerves II through XII intact.     Motor Exam   Muscle bulk: normal    Strength   Strength 5/5 throughout.     Sensory Exam   Light touch normal.     Gait, Coordination, and Reflexes     Reflexes   Right brachioradialis: 2+  Left brachioradialis: 2+  Right patellar: 1+  Left patellar: 1+Antalgic gait.  Mild intention tremor noted to bilateral hands.  No bradykinesia noted.  No rigidity.         Result Review :             MRI Brain 1/15/22:  1. Right posterolateral basal ganglia punctate focus of subacute macro   hemorrhage, concordant with a punctate focus of increased density on   prior CT dated 01/15/2022 which had appeared like calcification but on   MRI is suggestive for microhemorrhage.   2. Advanced white matter changes for age as discussed.   3. Multifocal small " chronic ischemic lesions.   4. Possible amyloid angiopathy.   5. Additional chronic appearing changes as above.     MoCA: 26/30    Assessment and Plan    Diagnoses and all orders for this visit:    1. Spell of altered cognition (Primary)  Assessment & Plan:  Due to repetitive grunting and repetitive movements of right hand, as well as intermittent altered cognition will order EEG to rule out seizure.  He is on donepezil and memenatine and carries an alzheimer's disease diagnosis from several years ago.  Discussed with niece that I am not convinced he has alzheimer's disease due to intact MoCA.  Would anticipate much more decline with a 8+ year diagnosis. Vascular dementia is more likely due to MRI brain presentation.      Orders:  -     EEG (Hospital Performed); Future    2. History of CVA (cerebrovascular accident)  Assessment & Plan:  Continue plavix and atorvastatin for secondary stroke risk reduction.  Discussed signs and symptoms of stroke including, but not limited to, visual disturbances, weakness of one side of the body, facial droop, cognitive changes, slurred speech, imbalance and dizziness.  Instructed patient to call 911 and get emergent medical treatment immediately at the onset of those symptoms.   Patient verbalized understanding.       I spent 45 minutes caring for Renae on this date of service. This time includes time spent by me in the following activities:preparing for the visit, reviewing tests, obtaining and/or reviewing a separately obtained history, performing a medically appropriate examination and/or evaluation , counseling and educating the patient/family/caregiver, ordering medications, tests, or procedures, documenting information in the medical record and independently interpreting results and communicating that information with the patient/family/caregiver  Follow Up   Return in about 3 months (around 2/14/2023) for seizure f/u.  Patient was given instructions and counseling regarding  his condition or for health maintenance advice. Please see specific information pulled into the AVS if appropriate.

## 2022-11-16 NOTE — ASSESSMENT & PLAN NOTE
Due to repetitive grunting and repetitive movements of right hand, as well as intermittent altered cognition will order EEG to rule out seizure.  He is on donepezil and memenatine and carries an alzheimer's disease diagnosis from several years ago.  Discussed with niece that I am not convinced he has alzheimer's disease due to intact MoCA.  Would anticipate much more decline with a 8+ year diagnosis. Vascular dementia is more likely due to MRI brain presentation.     Hospitalist Discharge Summary      Same day admit and discharge.  See H&P for course detail.    +++++++++++++++++++++++++++++++++++++++++++++++++  Jorje Patel 70, [place]

## 2022-11-16 NOTE — ASSESSMENT & PLAN NOTE
Continue plavix and atorvastatin for secondary stroke risk reduction.  Discussed signs and symptoms of stroke including, but not limited to, visual disturbances, weakness of one side of the body, facial droop, cognitive changes, slurred speech, imbalance and dizziness.  Instructed patient to call 911 and get emergent medical treatment immediately at the onset of those symptoms.   Patient verbalized understanding.

## 2022-11-21 ENCOUNTER — OFFICE VISIT (OUTPATIENT)
Dept: CARDIOLOGY | Facility: CLINIC | Age: 82
End: 2022-11-21

## 2022-11-21 ENCOUNTER — CLINICAL SUPPORT NO REQUIREMENTS (OUTPATIENT)
Dept: CARDIOLOGY | Facility: CLINIC | Age: 82
End: 2022-11-21

## 2022-11-21 VITALS
DIASTOLIC BLOOD PRESSURE: 58 MMHG | BODY MASS INDEX: 29.03 KG/M2 | HEART RATE: 62 BPM | WEIGHT: 185 LBS | HEIGHT: 67 IN | SYSTOLIC BLOOD PRESSURE: 142 MMHG

## 2022-11-21 DIAGNOSIS — E78.2 MIXED HYPERLIPIDEMIA: ICD-10-CM

## 2022-11-21 DIAGNOSIS — I48.0 PAF (PAROXYSMAL ATRIAL FIBRILLATION): Primary | ICD-10-CM

## 2022-11-21 DIAGNOSIS — N18.4 CKD (CHRONIC KIDNEY DISEASE) STAGE 4, GFR 15-29 ML/MIN: ICD-10-CM

## 2022-11-21 DIAGNOSIS — Z95.0 PRESENCE OF CARDIAC PACEMAKER: Primary | ICD-10-CM

## 2022-11-21 DIAGNOSIS — I49.5 SSS (SICK SINUS SYNDROME): ICD-10-CM

## 2022-11-21 DIAGNOSIS — I49.5 SICK SINUS SYNDROME: ICD-10-CM

## 2022-11-21 DIAGNOSIS — I10 ESSENTIAL HYPERTENSION: ICD-10-CM

## 2022-11-21 PROBLEM — G47.33 OSA (OBSTRUCTIVE SLEEP APNEA): Status: ACTIVE | Noted: 2022-09-29

## 2022-11-21 PROCEDURE — 99214 OFFICE O/P EST MOD 30 MIN: CPT | Performed by: NURSE PRACTITIONER

## 2022-11-21 PROCEDURE — 93280 PM DEVICE PROGR EVAL DUAL: CPT | Performed by: INTERNAL MEDICINE

## 2022-11-21 RX ORDER — LOSARTAN POTASSIUM AND HYDROCHLOROTHIAZIDE 25; 100 MG/1; MG/1
1 TABLET ORAL DAILY
Qty: 90 TABLET | Refills: 3 | Status: SHIPPED | OUTPATIENT
Start: 2022-11-21

## 2022-11-21 RX ORDER — ATORVASTATIN CALCIUM 40 MG/1
20 TABLET, FILM COATED ORAL DAILY
Qty: 45 TABLET | Refills: 3 | Status: SHIPPED | OUTPATIENT
Start: 2022-11-21

## 2022-11-21 NOTE — PROGRESS NOTES
Chief Complaint  Follow-up    Subjective            History of Present Illness  Renae Pandya is an 82-year-old white/ male patient who presents to the office today for follow-up.  He has paroxysmal atrial fibrillation, presence of a pacemaker, hypertension, and hyperlipidemia.  He reports compliance with all of his medications.  He denies any chest pain, shortness of breath, lightheadedness/dizziness, palpitations, or edema.    PMH  Past Medical History:   Diagnosis Date   • Essential hypertension 06/23/2016   • Hyperlipidemia    • PAF (paroxysmal atrial fibrillation) (Formerly [facility]) 08/04/2021   • Sick sinus syndrome w/ dual PPM 10/09/2015   • Trigeminal neuralgia          ALLERGY  Allergies   Allergen Reactions   • Codeine Sulfate    • Penicillins    • Propoxyphene Unknown (See Comments)   • Sulfa Antibiotics           SURGICALHX  Past Surgical History:   Procedure Laterality Date   • PACEMAKER IMPLANTATION     • PROSTATE SURGERY     • SHOULDER SURGERY Right    • TRIGEMINAL NERVE DECOMPRESSION            SOC  Social History     Socioeconomic History   • Marital status:    Tobacco Use   • Smoking status: Former   • Smokeless tobacco: Never   Vaping Use   • Vaping Use: Never used   Substance and Sexual Activity   • Alcohol use: No   • Drug use: No   • Sexual activity: Defer         FAMHX  History reviewed. No pertinent family history.       MEDSIGONLY  Current Outpatient Medications on File Prior to Visit   Medication Sig   • amLODIPine (NORVASC) 10 MG tablet Take 1 tablet by mouth Daily.   • aspirin (aspirin) 81 MG EC tablet Take 1 tablet by mouth Daily.   • atorvastatin (LIPITOR) 40 MG tablet Take 20 mg by mouth Daily.   • Cholecalciferol (Vitamin D3) 25 MCG (1000 UT) capsule Take 1,000 Units by mouth Daily.   • clopidogrel (PLAVIX) 75 MG tablet Take 75 mg by mouth Daily.   • donepezil (ARICEPT) 10 MG tablet Take  by mouth daily.   • doxazosin (CARDURA) 4 MG tablet Take 1 tablet by mouth 2 (Two) Times a Day.  "  • latanoprost (XALATAN) 0.005 % ophthalmic solution INSTILL 1 DROP IN BOTH EYES EVERY NIGHT AT BEDTIME   • losartan-hydrochlorothiazide (Hyzaar) 100-25 MG per tablet Take 1 tablet by mouth Daily.   • memantine (NAMENDA) 10 MG tablet Take 1 tablet by mouth 2 (Two) Times a Day.   • tamsulosin (FLOMAX) 0.4 MG capsule 24 hr capsule 1 capsule Daily.     No current facility-administered medications on file prior to visit.       Objective   /58   Pulse 62   Ht 170.2 cm (67\")   Wt 83.9 kg (185 lb)   BMI 28.98 kg/m²       Physical Exam  HENT:      Head: Normocephalic.   Neck:      Vascular: No carotid bruit.   Cardiovascular:      Rate and Rhythm: Normal rate and regular rhythm.      Pulses: Normal pulses.      Heart sounds: Normal heart sounds. No murmur heard.  Pulmonary:      Effort: Pulmonary effort is normal.      Breath sounds: Normal breath sounds.   Musculoskeletal:      Cervical back: Neck supple.      Right lower leg: No edema.      Left lower leg: No edema.   Skin:     General: Skin is dry.      Capillary Refill: Capillary refill takes less than 2 seconds.   Neurological:      Mental Status: He is alert and oriented to person, place, and time.   Psychiatric:         Behavior: Behavior normal.       Result Review :   The following data was reviewed by: SANTI Unger on 11/21/2022:  No results found for: PROBNP  CMP    CMP 1/21/22   Potassium 3.8            No results found for: TSH   No results found for: FREET4   No results found for: DDIMERQUANT  No results found for: MG   No results found for: DIGOXIN   No results found for: TROPONINT          [id number] Score: 3      Assessment and Plan    Diagnoses and all orders for this visit:    1. PAF (paroxysmal atrial fibrillation) (Primary)  Symptomatically stable at this time, continue aspirin 81 mg daily and Plavix 75 mg daily.  He is not Eliquis due to fall issues, will continue to monitor his pacemaker to see if recurrent A. fib episodes " occur.    2. Sick sinus syndrome w/ dual PPM  Device interrogated in office today and shows normal function.    3. Essential hypertension  Currently controlled and without adverse effect from medication, continue amlodipine 10 mg daily, doxazosin 4 mg twice daily, and losartan HCTZ 100-25 mg daily.  Low-sodium diet discussed.    4. Mixed hyperlipidemia  His last lipid panel was 5/11/2022 with LDL of 80 which is within his goal range, continue atorvastatin 20 mg nightly.    5. CKD (chronic kidney disease) stage 4, GFR 15-29 ml/min ([facility])  -     Ambulatory Referral to Nephrology    Other orders  -     losartan-hydrochlorothiazide (Hyzaar) 100-25 MG per tablet; Take 1 tablet by mouth Daily.  Dispense: 90 tablet; Refill: 3  -     atorvastatin (LIPITOR) 40 MG tablet; Take 0.5 tablets by mouth Daily.  Dispense: 45 tablet; Refill: 3            Follow Up   Return in about 6 months (around 5/21/2023) for Follow up with Dr Clark with device check.    Patient was given instructions and counseling regarding his condition or for health maintenance advice. Please see specific information pulled into the AVS if appropriate.     Renae Pandya  reports that he has quit smoking. He has never used smokeless tobacco..          Sharon Guevara, APRN  11/21/22  11:20 EST    Dictated Utilizing Dragon Dictation

## 2022-12-13 ENCOUNTER — APPOINTMENT (OUTPATIENT)
Dept: NEUROLOGY | Facility: HOSPITAL | Age: 82
End: 2022-12-13

## 2023-06-14 ENCOUNTER — OFFICE VISIT (OUTPATIENT)
Dept: CARDIOLOGY | Facility: CLINIC | Age: 83
End: 2023-06-14
Payer: MEDICARE

## 2023-06-14 VITALS
HEART RATE: 58 BPM | DIASTOLIC BLOOD PRESSURE: 57 MMHG | HEIGHT: 67 IN | SYSTOLIC BLOOD PRESSURE: 142 MMHG | BODY MASS INDEX: 28.41 KG/M2 | WEIGHT: 181 LBS

## 2023-06-14 DIAGNOSIS — I10 ESSENTIAL HYPERTENSION: ICD-10-CM

## 2023-06-14 DIAGNOSIS — I48.0 PAF (PAROXYSMAL ATRIAL FIBRILLATION): ICD-10-CM

## 2023-06-14 DIAGNOSIS — I49.5 SICK SINUS SYNDROME: Primary | ICD-10-CM

## 2023-06-14 PROCEDURE — 3078F DIAST BP <80 MM HG: CPT | Performed by: INTERNAL MEDICINE

## 2023-06-14 PROCEDURE — 99214 OFFICE O/P EST MOD 30 MIN: CPT | Performed by: INTERNAL MEDICINE

## 2023-06-14 PROCEDURE — 3077F SYST BP >= 140 MM HG: CPT | Performed by: INTERNAL MEDICINE

## 2023-06-14 RX ORDER — DAPAGLIFLOZIN 10 MG/1
10 TABLET, FILM COATED ORAL DAILY
COMMUNITY
Start: 2023-03-31

## 2023-06-14 NOTE — ASSESSMENT & PLAN NOTE
Symptomatically stable continue with his current pacemaker device settings repeat interrogation 6 months

## 2023-06-14 NOTE — ASSESSMENT & PLAN NOTE
Patient maintain normal sinus rhythm on aspirin and Plavix if recurrent A-fib episodes may need to start NOAC and discontinue with antiplatelets

## 2023-06-14 NOTE — PROGRESS NOTES
Chief Complaint  Follow-up, Pacemaker Check, and Atrial Fibrillation    Subjective    Patient is doing well denies any systemic problems.  No chest pain and no change in breathing capacity  Past Medical History:   Diagnosis Date    Essential hypertension 06/23/2016    Hyperlipidemia     PAF (paroxysmal atrial fibrillation) 08/04/2021    Sick sinus syndrome w/ dual PPM 10/09/2015    Trigeminal neuralgia          Current Outpatient Medications:     amLODIPine (NORVASC) 10 MG tablet, Take 1 tablet by mouth Daily., Disp: , Rfl:     aspirin (aspirin) 81 MG EC tablet, Take 1 tablet by mouth Daily., Disp: 90 tablet, Rfl: 3    atorvastatin (LIPITOR) 40 MG tablet, Take 0.5 tablets by mouth Daily., Disp: 45 tablet, Rfl: 3    Cholecalciferol (Vitamin D3) 25 MCG (1000 UT) capsule, Take 1 capsule by mouth Daily., Disp: , Rfl:     clopidogrel (PLAVIX) 75 MG tablet, Take 1 tablet by mouth Daily., Disp: , Rfl:     donepezil (ARICEPT) 10 MG tablet, Take  by mouth daily., Disp: , Rfl:     doxazosin (CARDURA) 4 MG tablet, Take 1 tablet by mouth 2 (Two) Times a Day., Disp: , Rfl:     latanoprost (XALATAN) 0.005 % ophthalmic solution, INSTILL 1 DROP IN BOTH EYES EVERY NIGHT AT BEDTIME, Disp: , Rfl:     losartan-hydrochlorothiazide (Hyzaar) 100-25 MG per tablet, Take 1 tablet by mouth Daily., Disp: 90 tablet, Rfl: 3    memantine (NAMENDA) 10 MG tablet, Take 1 tablet by mouth 2 (Two) Times a Day., Disp: , Rfl:     tamsulosin (FLOMAX) 0.4 MG capsule 24 hr capsule, 1 capsule Daily., Disp: , Rfl:     Farxiga 10 MG tablet, Take 10 mg by mouth Daily., Disp: , Rfl:     There are no discontinued medications.  Allergies   Allergen Reactions    Codeine Sulfate     Penicillins     Propoxyphene Unknown (See Comments)    Sulfa Antibiotics         Social History     Tobacco Use    Smoking status: Former    Smokeless tobacco: Never   Vaping Use    Vaping Use: Never used   Substance Use Topics    Alcohol use: No    Drug use: No       History reviewed.  "No pertinent family history.     Objective     /57   Pulse 58   Ht 170.2 cm (67\")   Wt 82.1 kg (181 lb)   BMI 28.35 kg/m²       Physical Exam    General Appearance:   no acute distress  Alert and oriented x3  HENT:   lips not cyanotic  Atraumatic  Neck:  No jvd   supple  Respiratory:  no respiratory distress  normal breath sounds  no rales  Cardiovascular:  Regular rate and rhythm  no S3, no S4   no murmur  no rub  Extremities  No cyanosis  lower extremity edema: none    Skin:   warm, dry  No rashes      Result Review :     ponent   Ref Range & Units5 mo agoComments CHOLESTEROL 2-TL   <200 mg/dL133   NATIONAL CHOLESTEROL GUIDELINES   [facility] ([facility]) GUIDELINES FOR CLASSIFICATION, TESTING AND MANAGEMENT OF CHOLESTEROL LEVELS IN ADULTS OVER 20 YEARS OF AGE. THIS NEW CLASSIFICATION CREATES THREE CATEGORIES OF RISK FOR CORONARY HEART DISEASE, REGARDLESS   OF AGE OR SEX, ACCORDING TO TOTAL LDL CHOLESTEROLS LEVELS.     BASED ON TOTAL CHOLESTEROL LEVEL   DESIRABLE      <200 MG/DL   BORDERLINE-HIGH 200-239 MG/DL   HIGH            >=240 MG/DL TRIGLYCERIDES 2-TL   30 - 200 mg/dL126 DUE TO REVISED SPECIFICITY OF A TRIGLYCERIDE RESULT AT 600MG/DL OR GREATER MAY CAUSE A POSITIVE BIAS OF APPROXIMATELY 2.1 MMOL/L TO CHLORIDE WHICH CAN RESULT IN AN ERRONEOUSLY LOW ANION GAP. HDL-TL   >60 mg/dL56 Low    <40 MG/DL= MAJOR RISK FACTOR FOR CHD   60 MG/DL OR GREATER= NEG RISK FACTOR FOR CHD LDL   0 - 99 mg/dL52 RISK FACTOR-TL   2    Component   Ref Range & Units 2 mo ago Comments   SODIUM-TL   136 - 145 mmol/L 145     POTASSIUM-TL   3.5 - 5.1 mmol/L 3.7     Chloride   98 - 107 mmol/L 108 High      CARBON DIOXIDE-TL   21 - 32 mmol/L 28     ANION GAP-TL   4 - 12 mmol/L 14 High      GLUCOSE-TL   70 - 110 mg/dL 86     BUN-TL   7 - 18 mg/dL 29 High      CREATININE-TL   0.8 - 1.3 mg/dL 2.0 High      ALBUMIN-TL   3.4 - 5.0 g/dL 4.3     CALCIUM-TL   8.7 - 10.2 mg/dL 9.4     PHOSPHATE-TL   2.5 - 4.9 " mg/dL 3.9     GFR ESTIMATE-TL   mL/min 33        ef Range & Units2 mo ago WBC (WHITE BLOOD CELL)-TL   4.8 - 10.8 10*3/uL9.1 RBC (RED BLOOD CELL-TL   4.70 - 6.10 10*6/uL4.17 Low  HEMOGLOBIN-TL   14.0 - 18.0 g/dL13.5 Low  HEMATOCRIT-TL   42 - 52 %41.1 Low  MCV-TL   80 - 94 fL98.6 High  MCH-TL   27 - 31 pg32.4 High  MCHC-TL   32 - 36 g/dL32.8 RDW-SD-TL   37 - 54 fL53.3 RDW-SD-TL   11.5 - 15.5 %14.7 Platelet Count-TL   130 - 400 10*3/uL428 High  MPV-TL   9.2 - 12.6 fL9.4 NRBC%-TL   0.0 %0.0 ABS NRBC-TL   0.0 10*3/uL0.000 DIFF TYPE-TL   AUTO DIFF       No results found for: TSH   No results found for: FREET4   No results found for: DDIMERQUANT  No results found for: MG   No results found for: DIGOXIN   No results found for: TROPONINT          No results found for: POCTROP  Dual-chamber pacemaker interrogated today by atrially working normally paced 93% time right ventricularly paced 83% time working normally no events recorded no changes made to device      Results for orders placed in visit on 11/17/22    Adult Transthoracic Echo Complete W/ Cont if Necessary Per Protocol    Interpretation Summary    Calculated left ventricular EF = 62%    Left ventricular wall thickness is consistent with mild concentric hypertrophy.                 Diagnoses and all orders for this visit:    1. Sick sinus syndrome w/ dual PPM (Primary)  Assessment & Plan:  Symptomatically stable continue with his current pacemaker device settings repeat interrogation 6 months      2. PAF (paroxysmal atrial fibrillation)  Assessment & Plan:  Patient maintain normal sinus rhythm on aspirin and Plavix if recurrent A-fib episodes may need to start NOAC and discontinue with antiplatelets      3. Essential hypertension            Follow Up     Return in about 6 months (around 12/14/2023) for Pacemaker w/f/u, Follow with Sharon Guevara.          Patient was given instructions and counseling regarding his condition or for health maintenance advice. Please see  specific information pulled into the AVS if appropriate.

## 2023-12-13 ENCOUNTER — CLINICAL SUPPORT NO REQUIREMENTS (OUTPATIENT)
Dept: CARDIOLOGY | Facility: CLINIC | Age: 83
End: 2023-12-13
Payer: MEDICARE

## 2023-12-13 ENCOUNTER — OFFICE VISIT (OUTPATIENT)
Dept: CARDIOLOGY | Facility: CLINIC | Age: 83
End: 2023-12-13
Payer: MEDICARE

## 2023-12-13 VITALS
HEIGHT: 67 IN | DIASTOLIC BLOOD PRESSURE: 70 MMHG | BODY MASS INDEX: 25.43 KG/M2 | WEIGHT: 162 LBS | SYSTOLIC BLOOD PRESSURE: 146 MMHG | HEART RATE: 78 BPM

## 2023-12-13 DIAGNOSIS — I49.5 SICK SINUS SYNDROME: ICD-10-CM

## 2023-12-13 DIAGNOSIS — I48.0 PAF (PAROXYSMAL ATRIAL FIBRILLATION): Primary | ICD-10-CM

## 2023-12-13 DIAGNOSIS — E78.2 MIXED HYPERLIPIDEMIA: ICD-10-CM

## 2023-12-13 DIAGNOSIS — Z95.0 PRESENCE OF CARDIAC PACEMAKER: ICD-10-CM

## 2023-12-13 DIAGNOSIS — I49.5 SICK SINUS SYNDROME: Primary | ICD-10-CM

## 2023-12-13 DIAGNOSIS — I10 ESSENTIAL HYPERTENSION: ICD-10-CM

## 2023-12-13 PROBLEM — R19.8 PERFORATED VISCUS: Status: RESOLVED | Noted: 2023-10-25 | Resolved: 2023-12-13

## 2023-12-13 PROBLEM — K65.9 PERITONITIS: Status: RESOLVED | Noted: 2023-10-31 | Resolved: 2023-12-13

## 2023-12-13 PROBLEM — R78.81 E COLI BACTEREMIA: Status: RESOLVED | Noted: 2023-10-26 | Resolved: 2023-12-13

## 2023-12-13 PROBLEM — B96.20 E COLI BACTEREMIA: Status: RESOLVED | Noted: 2023-10-26 | Resolved: 2023-12-13

## 2023-12-13 RX ORDER — IPRATROPIUM BROMIDE 21 UG/1
2 SPRAY, METERED NASAL
COMMUNITY
Start: 2023-11-16

## 2023-12-13 RX ORDER — ATORVASTATIN CALCIUM 40 MG/1
20 TABLET, FILM COATED ORAL DAILY
Qty: 45 TABLET | Refills: 3 | Status: SHIPPED | OUTPATIENT
Start: 2023-12-13

## 2023-12-13 RX ORDER — FLUTICASONE PROPIONATE 50 MCG
2 SPRAY, SUSPENSION (ML) NASAL
COMMUNITY
Start: 2023-11-07 | End: 2024-11-07

## 2023-12-13 RX ORDER — LOSARTAN POTASSIUM AND HYDROCHLOROTHIAZIDE 25; 100 MG/1; MG/1
1 TABLET ORAL DAILY
Qty: 90 TABLET | Refills: 3 | Status: SHIPPED | OUTPATIENT
Start: 2023-12-13

## 2023-12-13 RX ORDER — FOLIC ACID 1 MG/1
1 TABLET ORAL DAILY
COMMUNITY
Start: 2023-11-08 | End: 2024-11-08

## 2023-12-13 NOTE — PROGRESS NOTES
Chief Complaint  Pacemaker Check and Follow-up    Subjective            History of Present Illness  Renae Pandya is an 83-year-old white/ male patient who presents to the office today for follow-up.  He has paroxysmal atrial fibrillation, pacemaker, hypertension, and hyperlipidemia.  He is compliant with medications.  He denies any chest pain, shortness of breath, lightheadedness/dizziness, palpitations, or edema.    PMH  Past Medical History:   Diagnosis Date    E coli bacteremia 10/26/2023    Essential hypertension 06/23/2016    Hyperlipidemia     PAF (paroxysmal atrial fibrillation) 08/04/2021    Perforated viscus 10/25/2023    Peritonitis 10/31/2023    Sick sinus syndrome w/ dual PPM 10/09/2015    Trigeminal neuralgia          ALLERGY  Allergies   Allergen Reactions    Codeine Sulfate     Penicillins     Propoxyphene Unknown (See Comments)    Sulfa Antibiotics           SURGICALHX  Past Surgical History:   Procedure Laterality Date    PACEMAKER IMPLANTATION      PROSTATE SURGERY      SHOULDER SURGERY Right     TRIGEMINAL NERVE DECOMPRESSION            SOC  Social History     Socioeconomic History    Marital status:    Tobacco Use    Smoking status: Former    Smokeless tobacco: Never   Vaping Use    Vaping Use: Never used   Substance and Sexual Activity    Alcohol use: No    Drug use: No    Sexual activity: Defer         FAMHX  History reviewed. No pertinent family history.       MEDSIGONLY  Current Outpatient Medications on File Prior to Visit   Medication Sig    amLODIPine (NORVASC) 10 MG tablet Take 1 tablet by mouth Daily.    aspirin (aspirin) 81 MG EC tablet Take 1 tablet by mouth Daily.    atorvastatin (LIPITOR) 40 MG tablet Take 0.5 tablets by mouth Daily.    Cholecalciferol (Vitamin D3) 25 MCG (1000 UT) capsule Take 1 capsule by mouth Daily.    clopidogrel (PLAVIX) 75 MG tablet Take 1 tablet by mouth Daily.    donepezil (ARICEPT) 10 MG tablet Take  by mouth daily.    doxazosin (CARDURA) 4  "MG tablet Take 1 tablet by mouth 2 (Two) Times a Day.    Farxiga 10 MG tablet Take 10 mg by mouth Daily.    latanoprost (XALATAN) 0.005 % ophthalmic solution INSTILL 1 DROP IN BOTH EYES EVERY NIGHT AT BEDTIME    losartan-hydrochlorothiazide (Hyzaar) 100-25 MG per tablet Take 1 tablet by mouth Daily.    memantine (NAMENDA) 10 MG tablet Take 1 tablet by mouth 2 (Two) Times a Day.    [DISCONTINUED] tamsulosin (FLOMAX) 0.4 MG capsule 24 hr capsule 1 capsule Daily.     No current facility-administered medications on file prior to visit.         Objective   /70   Pulse 78   Ht 170.2 cm (67\")   Wt 73.5 kg (162 lb)   BMI 25.37 kg/m²       Physical Exam  HENT:      Head: Normocephalic.   Neck:      Vascular: No carotid bruit.   Cardiovascular:      Rate and Rhythm: Normal rate and regular rhythm.      Pulses: Normal pulses.      Heart sounds: Normal heart sounds. No murmur heard.  Pulmonary:      Effort: Pulmonary effort is normal.      Breath sounds: Normal breath sounds.   Musculoskeletal:      Cervical back: Neck supple.      Right lower leg: No edema.      Left lower leg: No edema.   Skin:     General: Skin is dry.   Neurological:      Mental Status: He is alert and oriented to person, place, and time.   Psychiatric:         Behavior: Behavior normal.       Result Review :   The following data was reviewed by: SANTI Unger on 12/13/2023:  No results found for: \"PROBNP\"     No results found for: \"TSH\"   No results found for: \"FREET4\"   No results found for: \"DDIMERQUANT\"  No results found for: \"MG\"   No results found for: \"DIGOXIN\"   No results found for: \"TROPONINT\"          Results for orders placed in visit on 11/17/22    Adult Transthoracic Echo Complete W/ Cont if Necessary Per Protocol    Interpretation Summary    Calculated left ventricular EF = 62%    Left ventricular wall thickness is consistent with mild concentric hypertrophy.      [id number] Score: 3          Assessment and Plan  "   Diagnoses and all orders for this visit:    1. PAF (paroxysmal atrial fibrillation) (Primary)  Symptomatically stable at this time, no episodes of atrial fibrillation seen on pacemaker, continue aspirin and Plavix for CVA prevention and continue to monitor for return of atrial fibrillation.    2. Sick sinus syndrome w/ dual PPM  Device interrogated in office today and shows no new episodes and normal functioning.  He has about 1 to 1-1/2 years left on the battery.    3. Essential hypertension  Currently controlled and without adverse effects from medication, continue amlodipine 10 mg daily and losartan/HCTZ 100-25 mg daily.    4. Mixed hyperlipidemia  Last lipid panel was 9/7/2023 with LDL 53 which is within goal range, continue atorvastatin 20 mg daily.    Other orders  -     losartan-hydrochlorothiazide (Hyzaar) 100-25 MG per tablet; Take 1 tablet by mouth Daily.  Dispense: 90 tablet; Refill: 3  -     atorvastatin (LIPITOR) 40 MG tablet; Take 0.5 tablets by mouth Daily.  Dispense: 45 tablet; Refill: 3            Follow Up   Return in about 6 months (around 6/13/2024) for Follow up with Dr Clark with device check.    Patient was given instructions and counseling regarding his condition or for health maintenance advice. Please see specific information pulled into the AVS if appropriate.     Renae Pandya  reports that he has quit smoking. He has never used smokeless tobacco.           Sharon Guevara, SANTI  12/13/23  13:12 EST    Dictated Utilizing Dragon Dictation

## 2024-03-06 ENCOUNTER — OFFICE VISIT (OUTPATIENT)
Dept: NEUROLOGY | Facility: CLINIC | Age: 84
End: 2024-03-06
Payer: MEDICARE

## 2024-03-06 VITALS
HEIGHT: 67 IN | BODY MASS INDEX: 26.92 KG/M2 | DIASTOLIC BLOOD PRESSURE: 69 MMHG | WEIGHT: 171.5 LBS | HEART RATE: 60 BPM | SYSTOLIC BLOOD PRESSURE: 171 MMHG

## 2024-03-06 DIAGNOSIS — R41.89 SPELL OF ALTERED COGNITION: Primary | ICD-10-CM

## 2024-03-06 DIAGNOSIS — Z86.73 HISTORY OF CVA (CEREBROVASCULAR ACCIDENT): ICD-10-CM

## 2024-03-06 RX ORDER — DOXAZOSIN MESYLATE 4 MG/1
TABLET ORAL
COMMUNITY
Start: 2024-01-27

## 2024-03-06 RX ORDER — CETIRIZINE HYDROCHLORIDE 10 MG/1
1 TABLET ORAL DAILY
COMMUNITY
Start: 2024-02-26

## 2024-03-06 RX ORDER — MEMANTINE HYDROCHLORIDE 10 MG/1
10 TABLET ORAL
COMMUNITY
Start: 2024-01-26 | End: 2025-01-26

## 2024-03-06 NOTE — PROGRESS NOTES
"Chief Complaint  Neurologic Problem    Subjective          Renae Pandya presents to [facility]  History of Present Illness  Following up for spells of altered cognition.  Last seen in 2022.  Did not get testing as ordered.  Continuing to have repetitive movement of right hand, and intermittent grunting.       Interval History:   States he's having difficulty with memory.  Cell phone will ring and he won't be able to figure out how to answer it.  Struggles with names.  Some days he ambulates better than others.  Some days his \"legs just won't work\".  Niece reports staring spells, spells of altered consciousness.  Is having repetitive movement of right hand, and grunts often.  Struggles to walk.         Objective   Vital Signs:   /69   Pulse 60   Ht 170.2 cm (67\")   Wt 77.8 kg (171 lb 8 oz)   BMI 26.86 kg/m²     Physical Exam  Neurological:      Mental Status: He is oriented to person, place, and time.      Cranial Nerves: Cranial nerves 2-12 are intact.      Motor: Motor strength is normal.     Deep Tendon Reflexes:      Reflex Scores:       Brachioradialis reflexes are 2+ on the right side and 2+ on the left side.       Patellar reflexes are 1+ on the right side and 1+ on the left side.       Neurologic Exam     Mental Status   Oriented to person, place, and time.     Cranial Nerves   Cranial nerves II through XII intact.     Motor Exam   Muscle bulk: normal    Strength   Strength 5/5 throughout.     Sensory Exam   Light touch normal.     Gait, Coordination, and Reflexes     Reflexes   Right brachioradialis: 2+  Left brachioradialis: 2+  Right patellar: 1+  Left patellar: 1+Antalgic gait.  Mild intention tremor noted to bilateral hands.  No bradykinesia noted.  No rigidity.         Result Review :             MoCA: 23/30    Assessment and Plan    Diagnoses and all orders for this visit:    1. Spell of altered cognition (Primary)  Assessment & Plan:  Due to " repetitive grunting and repetitive movements of right hand, as well as intermittent altered cognition will order EEG to rule out seizure.  He is on donepezil and memantine.  MoCA remains fairly stable.     Orders:  -     EEG (Hospital Performed); Future    2. History of CVA (cerebrovascular accident)        Follow Up   Return in about 4 months (around 7/6/2024) for seizure f/u.  Patient was given instructions and counseling regarding his condition or for health maintenance advice. Please see specific information pulled into the AVS if appropriate.

## 2024-03-08 NOTE — ASSESSMENT & PLAN NOTE
Due to repetitive grunting and repetitive movements of right hand, as well as intermittent altered cognition will order EEG to rule out seizure.  He is on donepezil and memantine.  MoCA remains fairly stable.

## 2024-09-11 ENCOUNTER — CLINICAL SUPPORT NO REQUIREMENTS (OUTPATIENT)
Dept: CARDIOLOGY | Facility: CLINIC | Age: 84
End: 2024-09-11
Payer: MEDICARE

## 2024-09-11 ENCOUNTER — OFFICE VISIT (OUTPATIENT)
Dept: CARDIOLOGY | Facility: CLINIC | Age: 84
End: 2024-09-11
Payer: MEDICARE

## 2024-09-11 VITALS
DIASTOLIC BLOOD PRESSURE: 61 MMHG | HEART RATE: 71 BPM | BODY MASS INDEX: 26.06 KG/M2 | HEIGHT: 67 IN | SYSTOLIC BLOOD PRESSURE: 172 MMHG | WEIGHT: 166 LBS

## 2024-09-11 DIAGNOSIS — I48.0 PAF (PAROXYSMAL ATRIAL FIBRILLATION): ICD-10-CM

## 2024-09-11 DIAGNOSIS — I10 ESSENTIAL HYPERTENSION: ICD-10-CM

## 2024-09-11 DIAGNOSIS — E78.2 MIXED HYPERLIPIDEMIA: ICD-10-CM

## 2024-09-11 DIAGNOSIS — I49.5 SICK SINUS SYNDROME: Primary | ICD-10-CM

## 2024-09-11 DIAGNOSIS — Z95.0 PRESENCE OF CARDIAC PACEMAKER: ICD-10-CM

## 2024-09-11 NOTE — ASSESSMENT & PLAN NOTE
Patient has maintained normal sinus rhythm primarily no recurrent A-fib episodes seen on last interrogation continue with Plavix 75 but given patient's anemia and positive Hemoccult recommended discontinuing his aspirin

## 2024-09-11 NOTE — PROGRESS NOTES
Chief Complaint  Follow-up, Pacemaker Check, Atrial Fibrillation, Hypertension, and Hyperlipidemia    Subjective    Patient is an 84-year-old with sick sinus syndrome paroxysmal atrial fibrillation and hypertension who was recently been admitted for an episode of urinary tract infection along with anemia and positive Hemoccult.  He reports no anginal chest pain no significant tachycardia problems  Past Medical History:   Diagnosis Date    E coli bacteremia 10/26/2023    Essential hypertension 06/23/2016    Hyperlipidemia     PAF (paroxysmal atrial fibrillation) 08/04/2021    Perforated viscus 10/25/2023    Peritonitis 10/31/2023    Sick sinus syndrome w/ dual PPM 10/09/2015    Trigeminal neuralgia          Current Outpatient Medications:     amLODIPine (NORVASC) 10 MG tablet, Take 1 tablet by mouth Daily., Disp: , Rfl:     atorvastatin (LIPITOR) 40 MG tablet, Take 0.5 tablets by mouth Daily., Disp: 45 tablet, Rfl: 3    cetirizine (zyrTEC) 10 MG tablet, Take 1 tablet by mouth Daily., Disp: , Rfl:     Cholecalciferol (Vitamin D3) 25 MCG (1000 UT) capsule, Take 1 capsule by mouth Daily., Disp: , Rfl:     clopidogrel (PLAVIX) 75 MG tablet, Take 1 tablet by mouth Daily., Disp: , Rfl:     donepezil (ARICEPT) 10 MG tablet, Take  by mouth daily., Disp: , Rfl:     doxazosin (CARDURA) 4 MG tablet, , Disp: , Rfl:     Farxiga 10 MG tablet, Take 10 mg by mouth Daily., Disp: , Rfl:     fluticasone (FLONASE) 50 MCG/ACT nasal spray, 2 sprays into the nostril(s) as directed by provider., Disp: , Rfl:     folic acid (FOLVITE) 1 MG tablet, Take 1 tablet by mouth Daily., Disp: , Rfl:     ipratropium (ATROVENT) 0.03 % nasal spray, 2 sprays into the nostril(s) as directed by provider., Disp: , Rfl:     latanoprost (XALATAN) 0.005 % ophthalmic solution, INSTILL 1 DROP IN BOTH EYES EVERY NIGHT AT BEDTIME, Disp: , Rfl:     losartan-hydrochlorothiazide (Hyzaar) 100-25 MG per tablet, Take 1 tablet by mouth Daily., Disp: 90 tablet, Rfl: 3     "memantine (NAMENDA) 10 MG tablet, Take 1 tablet by mouth., Disp: , Rfl:     Medications Discontinued During This Encounter   Medication Reason    aspirin (aspirin) 81 MG EC tablet      Allergies   Allergen Reactions    Codeine Sulfate     Penicillins     Propoxyphene Unknown (See Comments)    Sulfa Antibiotics         Social History     Tobacco Use    Smoking status: Former    Smokeless tobacco: Never   Vaping Use    Vaping status: Never Used   Substance Use Topics    Alcohol use: No    Drug use: No       History reviewed. No pertinent family history.     Objective     /61   Pulse 71   Ht 170.2 cm (67\")   Wt 75.3 kg (166 lb)   BMI 26.00 kg/m²       Physical Exam    General Appearance:   no acute distress  Alert and oriented x3  HENT:   lips not cyanotic  Atraumatic  Neck:  No jvd   supple  Respiratory:  no respiratory distress  normal breath sounds  no rales  Cardiovascular:  Regular rate and rhythm  no S3, no S4   no murmur  no rub  Extremities  No cyanosis  lower extremity edema: none    Skin:   warm, dry  No rashes      Result Review :     No results found for: \"PROBNP\"       No results found for: \"TSH\"   No results found for: \"FREET4\"   No results found for: \"DDIMERQUANT\"  No results found for: \"MG\"   No results found for: \"DIGOXIN\"   No results found for: \"TROPONINT\"        Component  Ref Range & Units 2 mo ago   WBC (WHITE BLOOD CELL)-TL  4.8 - 10.8 10*3/uL 9.3   RBC (RED BLOOD CELL-TL  4.70 - 6.10 10*6/uL 2.90 Low    HEMOGLOBIN-TL  14.0 - 18.0 g/dL 8.6 Low    HEMATOCRIT-TL  42 - 52 % 27.8 Low    MCV-TL  80 - 94 fL 95.9 High    MCH-TL  27 - 31 pg 29.7   MCHC-TL  32 - 36 g/dL 30.9 Low    RDW-SD-TL  37 - 54 fL 50.4   RDW-SD-TL  11.5 - 15.5 % 14.1   Platelet Count-TL  130 - 400 10*3/uL 420 High    MPV-TL  9.2 - 12.6 fL 10.1   NRBC%-TL  0.0 % 0.0   ABS NRBC-TL  0.0 10*3/uL 0.000   DIFF TYPE-TL AUTO DIFF   NEUT%-TL  % 73.4   LYMPH%-TL  % 11.9   MONO%-TL  % 9.8   Percent Eosinophils  % 3.9   BASO%-TL  % " "0.6   IG %-TL  % 0.4   ABS NEUT-TL  2.2 - 4.8 10*3/uL 6.810 High    ABS LYMPH-TL  1.3 - 2.9 10*3/uL 1.110 Low    ABS MONO-TL  0.1 - 1.0 10*3/uL 0.910   ABS EOS-TL  0.00 - 0.40 10*3/uL 0.360   ABS BASO-TL  0.0 - 0.1 10*3/uL 0.060   ABS IG-TL  0.0 - 2.0 10*3/uL 0.040       mponent  Ref Range & Units 2 mo ago Comments   GLUCOSE-TL  70 - 110 mg/dL 94    BUN-TL  7 - 20 mg/dL 23 High     CREATININE-TL  0.7 - 1.5 mg/dL 1.7 High     SODIUM-TL  137 - 145 mmol/L 138    POTASSIUM-TL  3.5 - 5.1 mmol/L 4.2    Chloride  98 - 107 mmol/L 117 High     CARBON DIOXIDE-TL  22 - 30 mmol/L 19 Low     CALCIUM-TL  8.4 - 10.2 mg/dL 8.5    ANION GAP-TL  4 - 12 mmol/L 6    GFR ESTIMATE-TL  mL/min 40      No results found for: \"POCTROP\"    Results for orders placed in visit on 11/17/22    Adult Transthoracic Echo Complete W/ Cont if Necessary Per Protocol    Interpretation Summary    Calculated left ventricular EF = 62%    Left ventricular wall thickness is consistent with mild concentric hypertrophy.          Dual-chamber pacemaker interrogation today device working normally approaching CANDACE will have patient follow back up in 3 months       Diagnoses and all orders for this visit:    1. Sick sinus syndrome w/ dual PPM (Primary)  Assessment & Plan:  Patient with normal functioning pacemake on interrogation approaching CANDACE set up for repeat interrogation in 3 months      2. PAF (paroxysmal atrial fibrillation)  Assessment & Plan:  Patient has maintained normal sinus rhythm primarily no recurrent A-fib episodes seen on last interrogation continue with Plavix 75 but given patient's anemia and positive Hemoccult recommended discontinuing his aspirin      3. Essential hypertension  Assessment & Plan:  Blood pressure well-controlled continue with losartan HCTZ 100/25 mg daily       4. Mixed hyperlipidemia            Follow Up     Return in about 6 months (around 3/11/2025) for Follow with Sharon Guevara.          Patient was given instructions and " counseling regarding his condition or for health maintenance advice. Please see specific information pulled into the AVS if appropriate.        [facility]

## 2024-09-11 NOTE — ASSESSMENT & PLAN NOTE
Patient with normal functioning pacemake on interrogation approaching CANDACE set up for repeat interrogation in 3 months

## 2025-03-13 ENCOUNTER — OFFICE VISIT (OUTPATIENT)
Dept: CARDIOLOGY | Facility: CLINIC | Age: 85
End: 2025-03-13
Payer: MEDICARE

## 2025-03-13 VITALS
SYSTOLIC BLOOD PRESSURE: 179 MMHG | BODY MASS INDEX: 28.22 KG/M2 | HEART RATE: 56 BPM | DIASTOLIC BLOOD PRESSURE: 144 MMHG | HEIGHT: 67 IN | WEIGHT: 179.8 LBS

## 2025-03-13 DIAGNOSIS — I48.0 PAF (PAROXYSMAL ATRIAL FIBRILLATION): Primary | ICD-10-CM

## 2025-03-13 DIAGNOSIS — I49.5 SICK SINUS SYNDROME: ICD-10-CM

## 2025-03-13 DIAGNOSIS — I10 ESSENTIAL HYPERTENSION: ICD-10-CM

## 2025-03-13 PROBLEM — K63.5 COLON POLYP: Status: ACTIVE | Noted: 2025-03-13

## 2025-03-13 PROBLEM — N18.32 STAGE 3B CHRONIC KIDNEY DISEASE: Status: ACTIVE | Noted: 2024-10-17

## 2025-03-13 PROBLEM — K76.0 FATTY LIVER: Status: ACTIVE | Noted: 2025-03-13

## 2025-03-13 PROBLEM — C67.9 PRIMARY MALIGNANT NEOPLASM OF BLADDER: Status: ACTIVE | Noted: 2023-07-20

## 2025-03-13 PROBLEM — H40.9 GLAUCOMA: Status: ACTIVE | Noted: 2023-07-20

## 2025-03-13 PROBLEM — K57.00: Status: RESOLVED | Noted: 2023-07-21 | Resolved: 2025-03-13

## 2025-03-13 PROCEDURE — 99214 OFFICE O/P EST MOD 30 MIN: CPT | Performed by: NURSE PRACTITIONER

## 2025-03-13 PROCEDURE — 3080F DIAST BP >= 90 MM HG: CPT | Performed by: NURSE PRACTITIONER

## 2025-03-13 PROCEDURE — 3077F SYST BP >= 140 MM HG: CPT | Performed by: NURSE PRACTITIONER

## 2025-03-13 RX ORDER — FUROSEMIDE 40 MG/1
40 TABLET ORAL DAILY
COMMUNITY
Start: 2025-02-26 | End: 2025-04-28

## 2025-03-13 RX ORDER — LORATADINE 10 MG/1
10 TABLET ORAL DAILY
COMMUNITY

## 2025-03-13 RX ORDER — LOSARTAN POTASSIUM 100 MG/1
100 TABLET ORAL DAILY
COMMUNITY
Start: 2025-01-28 | End: 2025-03-14

## 2025-03-13 RX ORDER — MEMANTINE HYDROCHLORIDE 5 MG/1
5 TABLET ORAL 2 TIMES DAILY
COMMUNITY

## 2025-03-13 RX ORDER — ASPIRIN 81 MG/1
81 TABLET, CHEWABLE ORAL DAILY
COMMUNITY

## 2025-03-13 RX ORDER — FERROUS SULFATE 325(65) MG
325 TABLET ORAL
COMMUNITY

## 2025-03-13 NOTE — PROGRESS NOTES
Chief Complaint  Follow-up, Hypertension, Atrial Fibrillation, and Hyperlipidemia    Subjective            History of Present Illness  Renae Pandya is an 84-year-old male patient who presents to the office today for follow up.    History of Present Illness  The patient presents for a follow-up visit regarding atrial fibrillation, pacemaker monitoring, and blood pressure management.    He reports no recent issues related to his atrial fibrillation.    He does not possess a home remote monitor for his pacemaker, which has been in place for nearly a decade.    He consistently monitors his blood pressure at home, noting that it tends to be slightly elevated.        PMH  Past Medical History:   Diagnosis Date    Diverticulitis of small intestine with perforation and abscess 07/21/2023    E coli bacteremia 10/26/2023    Essential hypertension 06/23/2016    Hyperlipidemia     PAF (paroxysmal atrial fibrillation) 08/04/2021    Perforated viscus 10/25/2023    Peritonitis 10/31/2023    Sick sinus syndrome w/ dual PPM 10/09/2015    Trigeminal neuralgia          ALLERGY  Allergies   Allergen Reactions    Codeine Sulfate     Penicillins     Propoxyphene Unknown (See Comments)    Sulfa Antibiotics           SURGICALHX  Past Surgical History:   Procedure Laterality Date    PACEMAKER IMPLANTATION      PROSTATE SURGERY      SHOULDER SURGERY Right     TRIGEMINAL NERVE DECOMPRESSION            SOC  Social History     Socioeconomic History    Marital status:    Tobacco Use    Smoking status: Former    Smokeless tobacco: Never   Vaping Use    Vaping status: Never Used   Substance and Sexual Activity    Alcohol use: No    Drug use: No    Sexual activity: Defer         FAMHX  History reviewed. No pertinent family history.       MEDSIGONLY  Current Outpatient Medications on File Prior to Visit   Medication Sig    furosemide (LASIX) 40 MG tablet Take 1 tablet by mouth Daily.    losartan (COZAAR) 100 MG tablet Take 1 tablet by mouth  "Daily.    amLODIPine (NORVASC) 10 MG tablet Take 1 tablet by mouth Daily.    atorvastatin (LIPITOR) 40 MG tablet Take 0.5 tablets by mouth Daily.    cetirizine (zyrTEC) 10 MG tablet Take 1 tablet by mouth Daily.    Cholecalciferol (Vitamin D3) 25 MCG (1000 UT) capsule Take 1 capsule by mouth Daily.    clopidogrel (PLAVIX) 75 MG tablet Take 1 tablet by mouth Daily.    donepezil (ARICEPT) 10 MG tablet Take  by mouth daily.    doxazosin (CARDURA) 4 MG tablet     empagliflozin (JARDIANCE) 25 MG tablet tablet Take 0.5 tablets by mouth Daily.    Farxiga 10 MG tablet Take 10 mg by mouth Daily.    fluticasone (FLONASE) 50 MCG/ACT nasal spray 2 sprays into the nostril(s) as directed by provider.    ipratropium (ATROVENT) 0.03 % nasal spray Administer 2 sprays into the nostril(s) as directed by provider.    latanoprost (XALATAN) 0.005 % ophthalmic solution INSTILL 1 DROP IN BOTH EYES EVERY NIGHT AT BEDTIME    [DISCONTINUED] losartan-hydrochlorothiazide (Hyzaar) 100-25 MG per tablet Take 1 tablet by mouth Daily.     No current facility-administered medications on file prior to visit.       Objective   BP (!) 179/144   Pulse 56   Ht 170.2 cm (67.01\")   Wt 81.6 kg (179 lb 12.8 oz)   BMI 28.15 kg/m²       Physical Exam  HENT:      Head: Normocephalic.   Neck:      Vascular: No carotid bruit.   Cardiovascular:      Rate and Rhythm: Normal rate and regular rhythm.      Pulses: Normal pulses.      Heart sounds: Normal heart sounds. No murmur heard.  Pulmonary:      Effort: Pulmonary effort is normal.      Breath sounds: Normal breath sounds.   Musculoskeletal:      Cervical back: Neck supple.      Right lower leg: No edema.      Left lower leg: No edema.   Skin:     General: Skin is dry.   Neurological:      Mental Status: He is alert and oriented to person, place, and time.   Psychiatric:         Behavior: Behavior normal.       Result Review :   The following data was reviewed by: SANTI Unger on 03/13/2025:  No " "results found for: \"PROBNP\"     No results found for: \"TSH\"   No results found for: \"FREET4\"   No results found for: \"DDIMERQUANT\"  No results found for: \"MG\"   No results found for: \"DIGOXIN\"   No results found for: \"TROPONINT\"        Results for orders placed in visit on 11/17/22    Adult Transthoracic Echo Complete W/ Cont if Necessary Per Protocol    Interpretation Summary    Calculated left ventricular EF = 62%    Left ventricular wall thickness is consistent with mild concentric hypertrophy.      [id number] Score: 3        Assessment and Plan    Diagnoses and all orders for this visit:    1. PAF (paroxysmal atrial fibrillation) (Primary)    2. Essential hypertension    3. Sick sinus syndrome w/ dual PPM      Assessment & Plan  1. Atrial Fibrillation.  He reports no recent issues with atrial fibrillation. The pacemaker was functioning well during the last visit in September, with no setting changes required. The battery life was estimated to last until March 2025. A device check will be performed today to assess the current battery status. Dr. Clark will conduct the device check. The patient is not pacemaker dependent, so the battery replacement can be scheduled in a couple of months.    2. Pacemaker Monitoring.  The pacemaker battery is nearing the end of its life, with an estimated 2 months remaining. A device check will be performed today to confirm the battery status. The patient will be scheduled for a battery replacement procedure in May 2025. This will be an outpatient procedure, and he will go home the same day.    3. Blood Pressure Management.  His blood pressure was elevated during today's visit, with initial readings of 205/103 and 179/144, and a recheck showing 192/86. He reports that his blood pressure readings at home are slightly high but not alarming. His niece will be contacted to discuss strategies for better blood pressure control.      Follow Up   Return in about 6 months (around 9/13/2025) " for Follow up with Dr Clark with device check.    Patient was given instructions and counseling regarding his condition or for health maintenance advice. Please see specific information pulled into the AVS if appropriate.     Renae Pandya  reports that he has quit smoking. He has never used smokeless tobacco.          Patient or patient representative verbalized consent for the use of Ambient Listening during the visit with  SANTI Unger for chart documentation. 3/14/2025  12:32 EDT    SANTI Unger  03/13/25  14:41 EDT    Dictated Utilizing Dragon Dictation

## 2025-03-14 RX ORDER — VALSARTAN 160 MG/1
160 TABLET ORAL DAILY
Qty: 30 TABLET | Refills: 1 | Status: SHIPPED | OUTPATIENT
Start: 2025-03-14

## 2025-03-17 ENCOUNTER — PREP FOR SURGERY (OUTPATIENT)
Dept: OTHER | Facility: HOSPITAL | Age: 85
End: 2025-03-17
Payer: MEDICARE

## 2025-03-17 DIAGNOSIS — Z45.010 PACEMAKER AT END OF BATTERY LIFE: Primary | ICD-10-CM

## 2025-03-17 RX ORDER — SODIUM CHLORIDE 9 MG/ML
40 INJECTION, SOLUTION INTRAVENOUS AS NEEDED
OUTPATIENT
Start: 2025-03-17

## 2025-03-17 RX ORDER — SODIUM CHLORIDE 0.9 % (FLUSH) 0.9 %
10 SYRINGE (ML) INJECTION EVERY 12 HOURS SCHEDULED
OUTPATIENT
Start: 2025-03-17

## 2025-03-17 RX ORDER — SODIUM CHLORIDE 0.9 % (FLUSH) 0.9 %
10 SYRINGE (ML) INJECTION AS NEEDED
OUTPATIENT
Start: 2025-03-17

## 2025-05-05 ENCOUNTER — TELEPHONE (OUTPATIENT)
Dept: CARDIOLOGY | Facility: CLINIC | Age: 85
End: 2025-05-05
Payer: MEDICARE

## 2025-05-05 NOTE — TELEPHONE ENCOUNTER
Caller: Patricia Solis    Relationship: Emergency Contact    Best call back number: [phone]    What was the call regarding: PT'S CAREGIVER CALLING IN DUE TO HER RECEIVING A NOTIFICATION ABOUT A GENERATOR CHANGE PROCEDURE FOR TOMORROW 5.6.25, SHE STATES THEY WERE NEVER CALLED AND TOLD ABOUT THIS APPT OR THE TIME TO COME IN, INSTRUCTIONS, ETC. SHE IS ASKING IF THEY CAN SET UP AN ACTUAL APPT DATE AND TIME OVER THE PHONE. PLEASE CALL BACK TO DISCUSS.     UNABLE TO WT TO OFFICE.

## 2025-05-06 PROBLEM — Z45.010 PACEMAKER AT END OF BATTERY LIFE: Status: ACTIVE | Noted: 2025-03-17

## 2025-05-13 ENCOUNTER — TELEPHONE (OUTPATIENT)
Dept: CARDIOLOGY | Facility: CLINIC | Age: 85
End: 2025-05-13
Payer: MEDICARE

## 2025-05-13 NOTE — TELEPHONE ENCOUNTER
I spoke to Patricia, patient's caregiver at [phone] and gave an appointment on 05/20/25 for [employer] dual chamber pacemaker battery change. Patient was instructed to have a  for the day of the procedure and to arrive at the main entrance registration area in the [facility]. Patient was instructed to continue all medications as usual. Patient was instructed to have no solid food or milk for 6 hours prior to scheduled arrival time, clear liquids only for 6 hours prior up to 2 hours prior to scheduled arrival time, NPO for 2 hours prior to scheduled arrival time. Patient was instructed to have labs completed on the morning of the procedure. Patient was advised surgery scheduling department will call the afternoon before the procedure to provide an arrival time. Patricia is agreeable with no other questions or concerns.

## 2025-05-19 RX ORDER — HYDRALAZINE HYDROCHLORIDE 50 MG/1
50 TABLET, FILM COATED ORAL 2 TIMES DAILY
COMMUNITY

## 2025-05-19 RX ORDER — FUROSEMIDE 40 MG/1
40 TABLET ORAL 2 TIMES DAILY
COMMUNITY

## 2025-05-19 RX ORDER — LOSARTAN POTASSIUM 100 MG/1
100 TABLET ORAL DAILY
COMMUNITY

## 2025-05-19 NOTE — H&P
Cardiology Consultation Note  [facility] CATH LAB          Patient Identification:  Renae Pandya      [id number]  84 y.o.        male  1940         PCP: Caprice Loera APRN      History of Present Illness:     Patient is a 84-year-old with a history of sick sinus syndrome and dual-chamber pacemaker, paroxysmal atrial fibrillation, essential pretension, dyslipidemia whose device now is reached CANDACE the patient denies any chest pain or fever or chills.    Past History:  Past Medical History:   Diagnosis Date    Cancer     SKIN CANCER ON FACE    CKD (chronic kidney disease) stage 4, GFR 15-29 ml/min     [facility]    Diverticulitis of small intestine with perforation and abscess 07/21/2023    E coli bacteremia 10/26/2023    Edema     LOWER EXTREMITITIES    Essential hypertension 06/23/2016    GI bleed     Hyperlipidemia     PAF (paroxysmal atrial fibrillation) 08/04/2021    [facility]    Perforated viscus 10/25/2023    Peritonitis 10/31/2023    Sick sinus syndrome w/ dual PPM 10/09/2015    [facility] DR [facility]    Stroke     MILD STROKE PER CAREGIVER- NO RESIDUAL    Trigeminal neuralgia      Past Surgical History:   Procedure Laterality Date    COLECTOMY PARTIAL / TOTAL      PARTIAL COLECTOMY R/T TO GI BLEED @ [facility]    EXCISION      LESIONS REMOVED FROM FACE (SKIN CANCER)    PACEMAKER IMPLANTATION      PROSTATE SURGERY      SHOULDER SURGERY Right     TRIGEMINAL NERVE DECOMPRESSION       Allergies   Allergen Reactions    Codeine Sulfate     Penicillins     Propoxyphene Unknown (See Comments)    Sulfa Antibiotics      Social History     Socioeconomic History    Marital status:    Tobacco Use    Smoking status: Former    Smokeless tobacco: Never   Vaping Use    Vaping status: Never Used   Substance and Sexual Activity    Alcohol use: No    Drug use: No    Sexual activity: Defer     History reviewed. No pertinent family history.  Medications:  No current facility-administered  "medications for this encounter.     Physical exam:    Ht 167.6 cm (66\")   Wt 79.8 kg (176 lb)   BMI 28.41 kg/m²  Body mass index is 28.41 kg/m².   Oxygen saturation   @FLOWAN(10::1)@ No data recorded    General Appearance:   no acute distress  HENT:   lips not cyanotic  Neck:  thyroid not enlarged  supple  Respiratory:  no respiratory distress  normal breath sounds  no rales  Cardiovascular:  no jugular venous distention  regular rhythm  apical impulse normal  S1 normal, S2 normal  no S3, no S4   no murmur  no rub, no thrill  no carotid bruit  pedal pulses normal  lower extremity edema: none    Gastrointestinal:   bowel sounds normal  non-tender  no hepatomegaly, no splenomegaly  Musculoskeletal:  no clubbing of fingers.   normocephalic, head atraumatic  Skin:   warm, dry  Neuro/Psychiatric:  judgement and insight appropriate  normal mood and affect    Cardiographics:   Results for orders placed in visit on 11/17/22    Adult Transthoracic Echo Complete W/ Cont if Necessary Per Protocol    Interpretation Summary    Calculated left ventricular EF = 62%    Left ventricular wall thickness is consistent with mild concentric hypertrophy.      No results found for this or any previous visit.      Cardiolite (Tc-99m Sestamibi) stress test     Lab Review:           Invalid input(s): \"PLATELETCT\"                            CrCl cannot be calculated (No successful lab value found.).         Invalid input(s): \"LDLCALC\"        No results found for: \"TSH\"     Lab Results   Component Value Date    HGBA1C 5.4 01/16/2022      No results found for: \"DIGOXIN\"   No results found for: \"DDIMERQUAN\"       Assessment:      Pacemaker at end of battery life      Initial cardiac assessment: Dual-chamber permanent pacemaker device working normally reached CANDACE recommend battery change discussed risk benefits and alternatives including the risk of bleeding, infection, and need for possible lead placement patient was " agreeable      Recommendations:  1.  Dual-chamber battery change        Thank you for allowing us to share in Renae garg.        Cole Clark MD  5/19/2025  14:51 EDT

## 2025-05-19 NOTE — PRE-PROCEDURE INSTRUCTIONS
PATIENT'S CAREGIVER ( SAW)  INSTRUCTED FOR PATIENT TO BE:    - PATIENT INSTRUCTED NOTHING TO EAT/ NO SOLID FOOD OR MILK FOR 6 HOURS PRIOR TO SCHEDULED ARRIVAL TIME.     - MAY HAVE CLEAR LIQUIDS ONLY UP TO 2 HOURS PRIOR TO SCHEDULED ARRIVAL TIME, EXCEPT CAN HAVE SIPS OF WATER WITH MEDICATIONS PRIOR TO PROCEDURE    -  INSTRUCTED NO LOTIONS, JEWELRY, PIERCINGS, OR DEODORANT DAY OF THE PROCEDURE    - INSTRUCTED TO TAKE THE FOLLOWING MEDICATIONS THE DAY OF SURGERY:        NORVASC, ZYRTEC, ASA, VITAMINS, PLAVIX, CARDURA, JARDIANCE, IRON, LASIX HYDRALAZINE, CLARITIN, LOSARTAN, NAMENDA ZOLOFT    - INSTRUCTED PT TO FOLLOW ANY INSTRUCTIONS GIVEN BY HIS CARDIOLOGIST.        * CONTINUE ALL MEDICATIONS PER CARDIOLOGY INSTRUCTIONS    - INFORMED PT AN INCISION WILL BE MADE IN UPPER CHEST FOR PACEMAKER PLACEMENT. HE/ SHE WILL GO HOME IF HAVING A BATTERY CHANGE TO THE PACEMAKER. IF A NEW DEVICE/PACEMAKER IS PLACED, HE/SHE WILL STAY OVER NIGHT FOR OBSERVATION AND MONITORING.     - INFORMED THE PATIENT HE CAN HAVE TWO VISITORS WITH HIM THE DAY OF THE PROCEDURE    - INSTRUCTED PT TO PARK ON THE NORTH SIDE OF THE HOSPITAL IN THE OPEN PARKING LOT, ENTER THE HOSPITAL THRU ENTRANCE C/ [address] AND  CHECK IN AT THE REGISTRATION DESK, AFTER REGISTRATION PT WILL BE TAKEN THE THE PREOP AREA FOR HIS OR HER PROCEDURE.    -ARRIVAL TIME GIVEN BY CARDIOLOGIST OFFICE, INFORMED PT IF ARRIVAL TIME CHANGES OR ADJUSTMENTS NEED TO BE MADE IN THEIR ARRIVAL TIME, HE/SHE WOULD RECEIVE A CALL.    -INSTRUCTED PT TO COME TO [facility] TO GET THEIR LABS/ EKG DONE 48 HOURS PRIOR TO PROCEDURE      - PATIENT'S CAREGIVER  VERBALIZED UNDERSTANDING

## 2025-05-20 ENCOUNTER — HOSPITAL ENCOUNTER (OUTPATIENT)
Facility: HOSPITAL | Age: 85
Setting detail: HOSPITAL OUTPATIENT SURGERY
Discharge: HOME | End: 2025-05-20
Attending: INTERNAL MEDICINE | Admitting: INTERNAL MEDICINE
Payer: MEDICARE

## 2025-05-20 VITALS
OXYGEN SATURATION: 95 % | DIASTOLIC BLOOD PRESSURE: 58 MMHG | SYSTOLIC BLOOD PRESSURE: 148 MMHG | TEMPERATURE: 97 F | BODY MASS INDEX: 31.29 KG/M2 | HEART RATE: 56 BPM | HEIGHT: 63 IN | WEIGHT: 176.59 LBS | RESPIRATION RATE: 16 BRPM

## 2025-05-20 DIAGNOSIS — Z45.010 PACEMAKER AT END OF BATTERY LIFE: ICD-10-CM

## 2025-05-20 LAB
ANION GAP SERPL CALCULATED.3IONS-SCNC: 11 MMOL/L (ref 5–15)
BASOPHILS # BLD AUTO: 0.06 10*3/MM3 (ref 0–0.2)
BASOPHILS NFR BLD AUTO: 0.7 % (ref 0–1.5)
BUN SERPL-MCNC: 44 MG/DL (ref 8–23)
BUN/CREAT SERPL: 14.1 (ref 7–25)
CALCIUM SPEC-SCNC: 9 MG/DL (ref 8.6–10.5)
CHLORIDE SERPL-SCNC: 105 MMOL/L (ref 98–107)
CO2 SERPL-SCNC: 25 MMOL/L (ref 22–29)
CREAT SERPL-MCNC: 3.11 MG/DL (ref 0.76–1.27)
DEPRECATED RDW RBC AUTO: 52.5 FL (ref 37–54)
EGFRCR SERPLBLD CKD-EPI 2021: 19.1 ML/MIN/1.73
EOSINOPHIL # BLD AUTO: 0.18 10*3/MM3 (ref 0–0.4)
EOSINOPHIL NFR BLD AUTO: 2 % (ref 0.3–6.2)
ERYTHROCYTE [DISTWIDTH] IN BLOOD BY AUTOMATED COUNT: 14.6 % (ref 12.3–15.4)
GLUCOSE SERPL-MCNC: 103 MG/DL (ref 65–99)
HCT VFR BLD AUTO: 37.9 % (ref 37.5–51)
HGB BLD-MCNC: 12.6 G/DL (ref 13–17.7)
IMM GRANULOCYTES # BLD AUTO: 0.03 10*3/MM3 (ref 0–0.05)
IMM GRANULOCYTES NFR BLD AUTO: 0.3 % (ref 0–0.5)
INR PPP: 1.06 (ref 0.86–1.15)
LYMPHOCYTES # BLD AUTO: 0.71 10*3/MM3 (ref 0.7–3.1)
LYMPHOCYTES NFR BLD AUTO: 8.1 % (ref 19.6–45.3)
MCH RBC QN AUTO: 32.1 PG (ref 26.6–33)
MCHC RBC AUTO-ENTMCNC: 33.2 G/DL (ref 31.5–35.7)
MCV RBC AUTO: 96.4 FL (ref 79–97)
MONOCYTES # BLD AUTO: 1.05 10*3/MM3 (ref 0.1–0.9)
MONOCYTES NFR BLD AUTO: 11.9 % (ref 5–12)
NEUTROPHILS NFR BLD AUTO: 6.77 10*3/MM3 (ref 1.7–7)
NEUTROPHILS NFR BLD AUTO: 77 % (ref 42.7–76)
NRBC BLD AUTO-RTO: 0 /100 WBC (ref 0–0.2)
PLATELET # BLD AUTO: 406 10*3/MM3 (ref 140–450)
PMV BLD AUTO: 9.3 FL (ref 6–12)
POTASSIUM SERPL-SCNC: 3.8 MMOL/L (ref 3.5–5.2)
PROTHROMBIN TIME: 14.3 SECONDS (ref 11.8–14.9)
RBC # BLD AUTO: 3.93 10*6/MM3 (ref 4.14–5.8)
SODIUM SERPL-SCNC: 141 MMOL/L (ref 136–145)
WBC NRBC COR # BLD AUTO: 8.8 10*3/MM3 (ref 3.4–10.8)

## 2025-05-20 PROCEDURE — 85025 COMPLETE CBC W/AUTO DIFF WBC: CPT | Performed by: NURSE PRACTITIONER

## 2025-05-20 PROCEDURE — 25010000002 CEFAZOLIN PER 500 MG: Performed by: INTERNAL MEDICINE

## 2025-05-20 PROCEDURE — 33228 REMV&REPLC PM GEN DUAL LEAD: CPT | Performed by: INTERNAL MEDICINE

## 2025-05-20 PROCEDURE — 80048 BASIC METABOLIC PNL TOTAL CA: CPT | Performed by: NURSE PRACTITIONER

## 2025-05-20 PROCEDURE — 25010000002 FENTANYL CITRATE (PF) 50 MCG/ML SOLUTION: Performed by: INTERNAL MEDICINE

## 2025-05-20 PROCEDURE — 25010000002 MIDAZOLAM PER 1MG: Performed by: INTERNAL MEDICINE

## 2025-05-20 PROCEDURE — 85610 PROTHROMBIN TIME: CPT | Performed by: NURSE PRACTITIONER

## 2025-05-20 PROCEDURE — C1785 PMKR, DUAL, RATE-RESP: HCPCS | Performed by: INTERNAL MEDICINE

## 2025-05-20 DEVICE — GEN PM AZURE XT SURESCAN DR MRI: Type: IMPLANTABLE DEVICE | Status: FUNCTIONAL

## 2025-05-20 RX ORDER — SODIUM CHLORIDE 9 MG/ML
40 INJECTION, SOLUTION INTRAVENOUS AS NEEDED
Status: DISCONTINUED | OUTPATIENT
Start: 2025-05-20 | End: 2025-05-20 | Stop reason: HOSPADM

## 2025-05-20 RX ORDER — FENTANYL CITRATE 50 UG/ML
INJECTION, SOLUTION INTRAMUSCULAR; INTRAVENOUS
Status: DISCONTINUED | OUTPATIENT
Start: 2025-05-20 | End: 2025-05-20 | Stop reason: HOSPADM

## 2025-05-20 RX ORDER — ONDANSETRON 2 MG/ML
4 INJECTION INTRAMUSCULAR; INTRAVENOUS EVERY 6 HOURS PRN
Status: DISCONTINUED | OUTPATIENT
Start: 2025-05-20 | End: 2025-05-20 | Stop reason: HOSPADM

## 2025-05-20 RX ORDER — MIDAZOLAM HYDROCHLORIDE 2 MG/2ML
INJECTION, SOLUTION INTRAMUSCULAR; INTRAVENOUS
Status: DISCONTINUED | OUTPATIENT
Start: 2025-05-20 | End: 2025-05-20 | Stop reason: HOSPADM

## 2025-05-20 RX ORDER — CEPHALEXIN 500 MG/1
500 CAPSULE ORAL 3 TIMES DAILY
Qty: 12 CAPSULE | Refills: 0 | Status: SHIPPED | OUTPATIENT
Start: 2025-05-20

## 2025-05-20 RX ORDER — SODIUM CHLORIDE 0.9 % (FLUSH) 0.9 %
3 SYRINGE (ML) INJECTION EVERY 12 HOURS SCHEDULED
Status: DISCONTINUED | OUTPATIENT
Start: 2025-05-20 | End: 2025-05-20 | Stop reason: HOSPADM

## 2025-05-20 RX ORDER — SODIUM CHLORIDE 0.9 % (FLUSH) 0.9 %
10 SYRINGE (ML) INJECTION AS NEEDED
Status: DISCONTINUED | OUTPATIENT
Start: 2025-05-20 | End: 2025-05-20 | Stop reason: HOSPADM

## 2025-05-20 RX ADMIN — SODIUM CHLORIDE 2 G: 9 INJECTION, SOLUTION INTRAVENOUS at 12:35

## 2025-05-20 NOTE — DISCHARGE INSTRUCTIONS
DISCHARGE INSTRUCTIONS  SURGICAL / AMBULATORY  PROCEDURES      For your surgery you had:  General anesthesia (you may have a sore throat for the first 24 hours)  IV sedation.  Local anesthesia  Monitored anesthesia Care  You received a medicated patch for nausea prevention today (behind your ear). It is recommended that you remove it 24-48 hours post-operatively. It must be removed within 72 hours.   You have received an anesthesia medication today that can cause hormonal forms of birth control to be ineffective. You should use a different form of birth control (to prevent pregnancy) for 7 days.   You may experience dizziness, drowsiness, or light-headedness for several hours following surgery/procedure.  Do not stay alone today or tonight.  Limit your activity for 24 hours.  Resume your diet slowly.  Follow whatever special dietary instructions you may have been given by your doctor.  You should not drive or operate machinery, drink alcohol, or sign legally binding documents for 24 hours or while you are taking pain medication.  Last dose of pain medication was given at:   .  NOTIFY YOUR DOCTOR IF YOU EXPERIENCE ANY OF THE FOLLOWING:  Temperature greater than 101 degrees Fahrenheit  Shaking Chills  Redness or excessive drainage from incision  Nausea, vomiting and/or pain that is not controlled by prescribed medications  Increase in bleeding or bleeding that is excessive  Unable to urinate in 6 hours after surgery  If unable to reach your doctor, please go to the closest Emergency Room    You may hand shower or sponge bathe   .  Apply an ice pack 24-48 hours.  Medications per physician instructions as indicated on Discharge Medication Information Sheet.    SPECIAL INSTRUCTIONS:     Follow up with Dr. Cox office with [facility] for wound check and device check in 1 week.

## 2025-05-20 NOTE — Clinical Note
Vitals did not record on sedation flowsheet through charting system, separate from [facility]. Vitals were monitored throughout the entirety of the procedure with no complications, VSS.

## 2025-05-27 ENCOUNTER — CLINICAL SUPPORT NO REQUIREMENTS (OUTPATIENT)
Dept: CARDIOLOGY | Facility: CLINIC | Age: 85
End: 2025-05-27
Payer: MEDICARE

## 2025-05-27 DIAGNOSIS — Z95.0 PRESENCE OF CARDIAC PACEMAKER: Primary | ICD-10-CM

## 2025-05-27 DIAGNOSIS — I48.0 PAF (PAROXYSMAL ATRIAL FIBRILLATION): ICD-10-CM

## 2025-05-27 DIAGNOSIS — I49.5 SICK SINUS SYNDROME: ICD-10-CM

## 2025-05-27 PROCEDURE — 93280 PM DEVICE PROGR EVAL DUAL: CPT | Performed by: INTERNAL MEDICINE

## (undated) DEVICE — YANKAUER,BULB TIP,W/O VENT,RIGID,STERILE: Brand: MEDLINE

## (undated) DEVICE — SUT SILK 0/0 CT2 18IN C027D

## (undated) DEVICE — ANTIBACTERIAL UNDYED BRAIDED (POLYGLACTIN 910), SYNTHETIC ABSORBABLE SUTURE: Brand: COATED VICRYL

## (undated) DEVICE — PENCL E/S SMOKEEVAC W/TELESCP CANN

## (undated) DEVICE — DRSNG SURG AQUACEL AG/ADVNTGE 9X15CM 3.5X6IN

## (undated) DEVICE — UNDYED BRAIDED (POLYGLACTIN 910), SYNTHETIC ABSORBABLE SUTURE: Brand: COATED VICRYL

## (undated) DEVICE — 8 FOOT DISPOSABLE EXTENSION CABLE WITH SAFE CONNECT / ALLIGATOR CLIP

## (undated) DEVICE — 3M™ STERI-STRIP™ REINFORCED ADHESIVE SKIN CLOSURES, R1546, 1/4 IN X 4 IN (6 MM X 100 MM), 10 STRIPS/ENVELOPE: Brand: 3M™ STERI-STRIP™

## (undated) DEVICE — TUBING, SUCTION, 1/4" X 12', STRAIGHT: Brand: MEDLINE